# Patient Record
Sex: MALE | Race: WHITE | NOT HISPANIC OR LATINO
[De-identification: names, ages, dates, MRNs, and addresses within clinical notes are randomized per-mention and may not be internally consistent; named-entity substitution may affect disease eponyms.]

---

## 2017-03-21 ENCOUNTER — APPOINTMENT (OUTPATIENT)
Dept: CARDIOLOGY | Facility: CLINIC | Age: 52
End: 2017-03-21

## 2017-04-04 ENCOUNTER — APPOINTMENT (OUTPATIENT)
Dept: CARDIOLOGY | Facility: CLINIC | Age: 52
End: 2017-04-04

## 2017-04-07 ENCOUNTER — APPOINTMENT (OUTPATIENT)
Dept: CARDIOLOGY | Facility: CLINIC | Age: 52
End: 2017-04-07

## 2017-05-03 ENCOUNTER — APPOINTMENT (OUTPATIENT)
Dept: CARDIOLOGY | Facility: CLINIC | Age: 52
End: 2017-05-03

## 2017-05-10 ENCOUNTER — APPOINTMENT (OUTPATIENT)
Dept: CARDIOLOGY | Facility: CLINIC | Age: 52
End: 2017-05-10

## 2017-05-24 ENCOUNTER — APPOINTMENT (OUTPATIENT)
Dept: CARDIOLOGY | Facility: CLINIC | Age: 52
End: 2017-05-24

## 2017-08-14 ENCOUNTER — APPOINTMENT (OUTPATIENT)
Dept: CARDIOLOGY | Facility: CLINIC | Age: 52
End: 2017-08-14
Payer: COMMERCIAL

## 2017-08-14 PROCEDURE — 99214 OFFICE O/P EST MOD 30 MIN: CPT

## 2017-11-27 ENCOUNTER — RX RENEWAL (OUTPATIENT)
Age: 52
End: 2017-11-27

## 2017-12-15 ENCOUNTER — RX RENEWAL (OUTPATIENT)
Age: 52
End: 2017-12-15

## 2018-01-26 ENCOUNTER — RECORD ABSTRACTING (OUTPATIENT)
Age: 53
End: 2018-01-26

## 2018-01-26 DIAGNOSIS — Z82.49 FAMILY HISTORY OF ISCHEMIC HEART DISEASE AND OTHER DISEASES OF THE CIRCULATORY SYSTEM: ICD-10-CM

## 2018-01-26 DIAGNOSIS — Z87.898 PERSONAL HISTORY OF OTHER SPECIFIED CONDITIONS: ICD-10-CM

## 2018-01-26 DIAGNOSIS — Z87.19 PERSONAL HISTORY OF OTHER DISEASES OF THE DIGESTIVE SYSTEM: ICD-10-CM

## 2018-01-26 DIAGNOSIS — R60.0 LOCALIZED EDEMA: ICD-10-CM

## 2018-01-26 DIAGNOSIS — Z78.9 OTHER SPECIFIED HEALTH STATUS: ICD-10-CM

## 2018-01-26 DIAGNOSIS — Z84.89 FAMILY HISTORY OF OTHER SPECIFIED CONDITIONS: ICD-10-CM

## 2018-01-26 RX ORDER — ATORVASTATIN CALCIUM 10 MG/1
10 TABLET, FILM COATED ORAL
Refills: 0 | Status: COMPLETED | COMMUNITY

## 2018-02-12 ENCOUNTER — NON-APPOINTMENT (OUTPATIENT)
Age: 53
End: 2018-02-12

## 2018-02-12 ENCOUNTER — APPOINTMENT (OUTPATIENT)
Dept: CARDIOLOGY | Facility: CLINIC | Age: 53
End: 2018-02-12
Payer: COMMERCIAL

## 2018-02-12 VITALS
HEIGHT: 67 IN | OXYGEN SATURATION: 97 % | HEART RATE: 64 BPM | SYSTOLIC BLOOD PRESSURE: 110 MMHG | DIASTOLIC BLOOD PRESSURE: 62 MMHG | BODY MASS INDEX: 32.02 KG/M2 | WEIGHT: 204 LBS

## 2018-02-12 DIAGNOSIS — Z87.891 PERSONAL HISTORY OF NICOTINE DEPENDENCE: ICD-10-CM

## 2018-02-12 DIAGNOSIS — F17.200 NICOTINE DEPENDENCE, UNSPECIFIED, UNCOMPLICATED: ICD-10-CM

## 2018-02-12 PROCEDURE — 93000 ELECTROCARDIOGRAM COMPLETE: CPT

## 2018-02-12 PROCEDURE — 99214 OFFICE O/P EST MOD 30 MIN: CPT

## 2018-02-12 RX ORDER — SAXAGLIPTIN AND METFORMIN HYDROCHLORIDE 2.5; 1 MG/1; MG/1
2.5-1 TABLET, FILM COATED, EXTENDED RELEASE ORAL
Qty: 90 | Refills: 3 | Status: DISCONTINUED | COMMUNITY
End: 2018-02-12

## 2018-02-12 RX ORDER — TAMSULOSIN HYDROCHLORIDE 0.4 MG/1
0.4 CAPSULE ORAL
Qty: 90 | Refills: 1 | Status: DISCONTINUED | COMMUNITY
End: 2018-02-12

## 2018-02-14 ENCOUNTER — MEDICATION RENEWAL (OUTPATIENT)
Age: 53
End: 2018-02-14

## 2018-02-26 ENCOUNTER — APPOINTMENT (OUTPATIENT)
Dept: CARDIOLOGY | Facility: CLINIC | Age: 53
End: 2018-02-26

## 2018-03-02 ENCOUNTER — MEDICATION RENEWAL (OUTPATIENT)
Age: 53
End: 2018-03-02

## 2018-03-09 ENCOUNTER — APPOINTMENT (OUTPATIENT)
Dept: CARDIOLOGY | Facility: CLINIC | Age: 53
End: 2018-03-09
Payer: COMMERCIAL

## 2018-03-09 DIAGNOSIS — Z86.39 PERSONAL HISTORY OF OTHER ENDOCRINE, NUTRITIONAL AND METABOLIC DISEASE: ICD-10-CM

## 2018-03-09 DIAGNOSIS — Z87.19 PERSONAL HISTORY OF OTHER DISEASES OF THE DIGESTIVE SYSTEM: ICD-10-CM

## 2018-03-09 DIAGNOSIS — Z86.79 PERSONAL HISTORY OF OTHER DISEASES OF THE CIRCULATORY SYSTEM: ICD-10-CM

## 2018-03-09 PROCEDURE — A9502: CPT

## 2018-03-09 PROCEDURE — 93015 CV STRESS TEST SUPVJ I&R: CPT

## 2018-03-09 PROCEDURE — 78452 HT MUSCLE IMAGE SPECT MULT: CPT

## 2018-03-12 ENCOUNTER — APPOINTMENT (OUTPATIENT)
Dept: CARDIOLOGY | Facility: CLINIC | Age: 53
End: 2018-03-12
Payer: COMMERCIAL

## 2018-03-12 VITALS
BODY MASS INDEX: 32.02 KG/M2 | HEART RATE: 80 BPM | WEIGHT: 204 LBS | SYSTOLIC BLOOD PRESSURE: 110 MMHG | OXYGEN SATURATION: 96 % | HEIGHT: 67 IN | DIASTOLIC BLOOD PRESSURE: 60 MMHG

## 2018-03-12 PROCEDURE — 99214 OFFICE O/P EST MOD 30 MIN: CPT

## 2018-03-12 RX ORDER — OXYBUTYNIN CHLORIDE 5 MG/1
5 TABLET ORAL DAILY
Refills: 0 | Status: DISCONTINUED | COMMUNITY
End: 2018-03-12

## 2018-03-12 RX ORDER — ALIROCUMAB 75 MG/ML
75 INJECTION, SOLUTION SUBCUTANEOUS
Qty: 6 | Refills: 1 | Status: DISCONTINUED | COMMUNITY
Start: 2018-02-12 | End: 2018-03-12

## 2018-05-21 ENCOUNTER — MEDICATION RENEWAL (OUTPATIENT)
Age: 53
End: 2018-05-21

## 2018-05-30 ENCOUNTER — APPOINTMENT (OUTPATIENT)
Dept: CARDIOLOGY | Facility: CLINIC | Age: 53
End: 2018-05-30
Payer: COMMERCIAL

## 2018-05-30 VITALS
WEIGHT: 201 LBS | RESPIRATION RATE: 16 BRPM | DIASTOLIC BLOOD PRESSURE: 68 MMHG | HEART RATE: 80 BPM | OXYGEN SATURATION: 96 % | HEIGHT: 67 IN | BODY MASS INDEX: 31.55 KG/M2 | SYSTOLIC BLOOD PRESSURE: 120 MMHG

## 2018-05-30 PROCEDURE — 99214 OFFICE O/P EST MOD 30 MIN: CPT

## 2018-09-06 ENCOUNTER — RX RENEWAL (OUTPATIENT)
Age: 53
End: 2018-09-06

## 2018-09-17 ENCOUNTER — APPOINTMENT (OUTPATIENT)
Dept: CARDIOLOGY | Facility: CLINIC | Age: 53
End: 2018-09-17
Payer: COMMERCIAL

## 2018-09-17 ENCOUNTER — RECORD ABSTRACTING (OUTPATIENT)
Age: 53
End: 2018-09-17

## 2018-09-17 VITALS
HEIGHT: 67 IN | WEIGHT: 206 LBS | DIASTOLIC BLOOD PRESSURE: 70 MMHG | BODY MASS INDEX: 32.33 KG/M2 | HEART RATE: 72 BPM | SYSTOLIC BLOOD PRESSURE: 120 MMHG | OXYGEN SATURATION: 97 %

## 2018-09-17 DIAGNOSIS — Z01.810 ENCOUNTER FOR PREPROCEDURAL CARDIOVASCULAR EXAMINATION: ICD-10-CM

## 2018-09-17 PROCEDURE — 99214 OFFICE O/P EST MOD 30 MIN: CPT

## 2018-11-13 ENCOUNTER — EMERGENCY (EMERGENCY)
Facility: HOSPITAL | Age: 53
LOS: 1 days | End: 2018-11-13
Payer: COMMERCIAL

## 2018-11-13 ENCOUNTER — OUTPATIENT (OUTPATIENT)
Dept: OUTPATIENT SERVICES | Facility: HOSPITAL | Age: 53
LOS: 1 days | End: 2018-11-13

## 2018-11-13 PROCEDURE — 93971 EXTREMITY STUDY: CPT | Mod: 26,RT

## 2018-11-13 PROCEDURE — 99285 EMERGENCY DEPT VISIT HI MDM: CPT

## 2018-11-13 PROCEDURE — 71046 X-RAY EXAM CHEST 2 VIEWS: CPT | Mod: 26

## 2018-11-14 PROCEDURE — 99285 EMERGENCY DEPT VISIT HI MDM: CPT

## 2018-11-20 ENCOUNTER — APPOINTMENT (OUTPATIENT)
Dept: CARDIOLOGY | Facility: CLINIC | Age: 53
End: 2018-11-20
Payer: COMMERCIAL

## 2018-11-20 VITALS
OXYGEN SATURATION: 98 % | HEIGHT: 67 IN | SYSTOLIC BLOOD PRESSURE: 124 MMHG | BODY MASS INDEX: 32.8 KG/M2 | HEART RATE: 81 BPM | DIASTOLIC BLOOD PRESSURE: 77 MMHG | WEIGHT: 209 LBS

## 2018-11-20 PROCEDURE — 99214 OFFICE O/P EST MOD 30 MIN: CPT

## 2018-11-20 RX ORDER — SITAGLIPTIN AND METFORMIN HYDROCHLORIDE 50; 1000 MG/1; MG/1
50-1000 TABLET, FILM COATED, EXTENDED RELEASE ORAL DAILY
Refills: 0 | Status: DISCONTINUED | COMMUNITY
End: 2018-11-20

## 2018-11-20 NOTE — HISTORY OF PRESENT ILLNESS
[FreeTextEntry1] : \par As you know his other active medical problems are as listed below.\par \par CAD: The patient is status post PCI of the left circumflex. 10/15.  Now the patient states he feels much better. Patient has no exertional chest discomfort or shortness of breath. CCS class 0.  On aspirin and Plavix. \par No bleeding complications. associated risks and benefits have been reviewed. DAP and associated risks/benefits have been reviewd.   On beta blockers.  Preserved LV systolic function.  On statin therapy.  Though at low dose due to intolerances.\par \par Dyslipidemia.  \par mixed. Could not tolerate Crestor.on REPATHA and fish oil. tolerating it well.\par \par Hypertensive heart disease.  No CHF.  No CKD.  Nicotine addiction. Stopped cigarette smoking.\par \par Type 2 diabetes mellitus. We well controlled. Last hemoglobin A1c 6.1. Uncomplicated.\par

## 2018-11-20 NOTE — REVIEW OF SYSTEMS
[Shortness Of Breath] : no shortness of breath [Dyspnea on exertion] : not dyspnea during exertion [Chest  Pressure] : no chest pressure [Chest Pain] : no chest pain [Lower Ext Edema] : no extremity edema [Leg Claudication] : no intermittent leg claudication [Palpitations] : no palpitations [Joint Pain] : no joint pain [Joint Swelling] : no joint swelling [Joint Stiffness] : no joint stiffness [Muscle Cramps] : no muscle cramps [Limb Weakness (Paresis)] : no limb weakness [Negative] : Heme/Lymph

## 2018-11-20 NOTE — REASON FOR VISIT
[Follow-Up - From Hospitalization] : follow-up of a recent hospitalization for [Chest Pain] : chest pain [Coronary Artery Disease] : coronary artery disease [Hyperlipidemia] : hyperlipidemia [Hypertension] : hypertension [Medication Management] : Medication management [FreeTextEntry1] : A 53-year-old comes in for followup after recent hospital admission to be chronic. [facility].\par I reviewed the available information from the hospital admission.\par He had intermittent left arm, neck, and upper shoulder pain, as well as left upper chest pain. All day long on November 13, 2018. He went to the hospital emergency room had an EKG, which showed normal sinus rhythm, incomplete right bundle branch block and nonspecific ST-T changes. His EKG remained stable. His troponins were negative. His labs otherwise were stable. He had left upper on DVT study, which was negative. His d-dimer was normal. He was seen and discharged home with diagnosis of musculoskeletal pain. His symptoms since then have almost resolved. He has no exertional symptoms. There was no associated diaphoresis, palpitations, nausea or vomiting.

## 2018-11-20 NOTE — ASSESSMENT
[FreeTextEntry1] : \par  Past test for reference.\par \par Reviewed labs from May 31, 2017 . glucose 126, potassium 4.6, sodium 137, creatinine 0.8 LFT normal. LDL 58, PhD of 39. Triglycerides 152. Total cholesterol 1.7.\par Other labs, June 14, 2017. Creatinine 0.85. Potassium 4.6, sodium 138.\par \par Coronary angiogram. October 2015. LV ejection fraction 55%. LCx 90% treated with drug-eluting stent placed. Mid RCA 10-30%.\par \par Echocardiogram. 12/1/2014. EF 55% PMR left atrial enlargement. l left ventricular hypertrophy. Mild diastolic LV dysfunction. Difficult study. Trace to mild mitral and tricuspid regurgitation.\par \par Carotid Doppler study. 12 2014. Mild nonobstructive disease bilaterally.\par \par Labs 5/4/2016 LDL 53 triglycerides 217 HDL 32 total cholesterol 128 LFT within normal range. \par \par EKG ordered and interpreted by me on 1219 2016 reviewed. Indication coronary artery disease. Interpretation by me. Normal sinus rhythm. Incomplete right bundle branch block\par \par echocardiogram with contrast enhancement was ordered and performed on 5/3/17. Demonstrated EF 60%, mild left ventricular enlargement with normal LV systolic function. \par \par Carotid Doppler, syncope, April 4, 2017. Intimal thickening.\par Echocardiogram. April 4, 2017. Technically very difficult study. Possible left ventricular enlargement. Possible to preserve his systolic function, though difficult to evaluate wall motion. There is borderline pulmonary pressures. There was mild mitral regurgitation.\par \par EKG ordered and interpreted by me on February 12, 2018. Indication atypical chest pain. According arteries is being dictation. Sinus bradycardia. Nonspecific ST-T changes.\par \par Reviewed on March 12, 2018.\par Most recent labs showed LDL cholesterol 79. otherwise, stable. CMP. This is on atorvastatin 10 mg.\par \par Stress myocardial perfusion scan. March 9, 2018. 9 minutes of [place] protocol. 93%. 10 mets. No chest pain. Stable. Blood pressure. Nonischemic EKG. Small mild defect made anteroseptal region, suggesting a possible infarct. Ejection fraction 59%.\par \par Reviewed on November 20, 2018\par EKG, chest x-ray, left upper extremity DVT study, radiology consultation report, and labs from November 13 in November 14, hospital admission, were reviewed from cardiology perspective.

## 2018-11-20 NOTE — PHYSICAL EXAM
[General Appearance - Well Developed] : well developed [Normal Appearance] : normal appearance [Well Groomed] : well groomed [General Appearance - Well Nourished] : well nourished [No Deformities] : no deformities [General Appearance - In No Acute Distress] : no acute distress [Normal Conjunctiva] : the conjunctiva exhibited no abnormalities [Eyelids - No Xanthelasma] : the eyelids demonstrated no xanthelasmas [No Oral Pallor] : no oral pallor [No Oral Cyanosis] : no oral cyanosis [] : no respiratory distress [Respiration, Rhythm And Depth] : normal respiratory rhythm and effort [Exaggerated Use Of Accessory Muscles For Inspiration] : no accessory muscle use [Auscultation Breath Sounds / Voice Sounds] : lungs were clear to auscultation bilaterally [Heart Rate And Rhythm] : heart rate and rhythm were normal [Heart Sounds] : normal S1 and S2 [Murmurs] : no murmurs present [Arterial Pulses Normal] : the arterial pulses were normal [Edema] : no peripheral edema present [Veins - Varicosity Changes] : no varicosital changes were noted in the lower extremities [FreeTextEntry1] : no gallop/rub/heave/click, normal PMI. no bruits [Abdomen Soft] : soft [Abdomen Tenderness] : non-tender [Abnormal Walk] : normal gait [Gait - Sufficient For Exercise Testing] : the gait was sufficient for exercise testing [Nail Clubbing] : no clubbing of the fingernails [Cyanosis, Localized] : no localized cyanosis [Oriented To Time, Place, And Person] : oriented to person, place, and time [Affect] : the affect was normal [Mood] : the mood was normal [No Anxiety] : not feeling anxious

## 2018-11-20 NOTE — DISCUSSION/SUMMARY
[FreeTextEntry1] : #1 Tolerating REPATHA , very well. Repeat CMP, lipid panel. No significant side effects. Continue lifestyle and risk factor modifications.\par \par #2 hypertensive heart disease. Mild left ventricular enlargement. Normal LV systolic function. Tolerating losartan 50 mg twice daily/ metoprolol.  He will continue with that at present. He is stable. Blood pressure. Understands risk and benefits.\par \par #3 hyperglycemia. Type 2 diabetes mellitus. Uncomplicated. At present. Counseling regarding dietary restrictions regular activity and exercise was done. Goals have been reviewed.\par \par #4 coronary atherosclerosis. Native vessel. Prior PCI in 2015 to left circumflex. Atypical symptoms. Probable musculoskeletal. Abnormal EKG at baseline. Stable findings reviewed the options of invasive evaluation and associated risks and benefits discussed. Decided with continued aggressive secondary prevention unless change in the symptoms in that case, he would go for invasive coronary angiography .\par If any change in his symptoms. He is to call 911.\par Continue aggressive lifestyle and risk factor modifications.\par Continue aspirin and Plavix at present.\par Lipid management as noted above.\par  Understand risks and benefits of dual antiplatelet agents. He has opted for continued dual antiplatelet agent. Continue low-dose beta blockers. \par \par #5 BMI goal closer to 25.\par \par #6 continue abstinence from cigarettes.\par \par #7 regular. Activity exercise were reviewed.\par \par All the above were at length reviewed. Answered all the questions. Thank you very much for this kind referral. Please do not hesitate to give me a call for any question.\par Part of this transcription was done with voice recognition software and phonetically similar errors are common. I apologize for that. Please donot hesitate to call for any questions due to above.\par \par f/u after 6 months or for any change in symptoms

## 2018-11-21 ENCOUNTER — TRANSCRIPTION ENCOUNTER (OUTPATIENT)
Age: 53
End: 2018-11-21

## 2018-12-03 ENCOUNTER — APPOINTMENT (OUTPATIENT)
Dept: CARDIOLOGY | Facility: CLINIC | Age: 53
End: 2018-12-03

## 2018-12-11 ENCOUNTER — RX RENEWAL (OUTPATIENT)
Age: 53
End: 2018-12-11

## 2018-12-18 ENCOUNTER — RX RENEWAL (OUTPATIENT)
Age: 53
End: 2018-12-18

## 2018-12-31 ENCOUNTER — RX RENEWAL (OUTPATIENT)
Age: 53
End: 2018-12-31

## 2018-12-31 ENCOUNTER — MED ADMIN CHARGE (OUTPATIENT)
Age: 53
End: 2018-12-31

## 2019-01-10 ENCOUNTER — MEDICATION RENEWAL (OUTPATIENT)
Age: 54
End: 2019-01-10

## 2019-01-24 ENCOUNTER — RECORD ABSTRACTING (OUTPATIENT)
Age: 54
End: 2019-01-24

## 2019-01-28 ENCOUNTER — RECORD ABSTRACTING (OUTPATIENT)
Age: 54
End: 2019-01-28

## 2019-01-28 ENCOUNTER — APPOINTMENT (OUTPATIENT)
Dept: CARDIOLOGY | Facility: CLINIC | Age: 54
End: 2019-01-28
Payer: COMMERCIAL

## 2019-01-28 VITALS
HEIGHT: 67 IN | WEIGHT: 204 LBS | OXYGEN SATURATION: 98 % | DIASTOLIC BLOOD PRESSURE: 60 MMHG | SYSTOLIC BLOOD PRESSURE: 118 MMHG | BODY MASS INDEX: 32.02 KG/M2 | HEART RATE: 75 BPM

## 2019-01-28 PROCEDURE — 99214 OFFICE O/P EST MOD 30 MIN: CPT

## 2019-01-28 RX ORDER — EVOLOCUMAB 140 MG/ML
140 INJECTION, SOLUTION SUBCUTANEOUS
Qty: 2 | Refills: 5 | Status: DISCONTINUED | COMMUNITY
Start: 2018-03-02 | End: 2019-01-28

## 2019-01-28 RX ORDER — SITAGLIPTIN 50 MG/1
50 TABLET, FILM COATED ORAL DAILY
Refills: 0 | Status: DISCONTINUED | COMMUNITY
End: 2019-01-28

## 2019-01-28 RX ORDER — OMEGA-3/DHA/EPA/FISH OIL 300-1000MG
1000 CAPSULE ORAL
Refills: 0 | Status: DISCONTINUED | COMMUNITY
End: 2019-01-28

## 2019-01-28 NOTE — ASSESSMENT
[FreeTextEntry1] : \par  Past test for reference.\par \par Reviewed labs from May 31, 2017 . glucose 126, potassium 4.6, sodium 137, creatinine 0.8 LFT normal. LDL 58, PhD of 39. Triglycerides 152. Total cholesterol 1.7.\par Other labs, June 14, 2017. Creatinine 0.85. Potassium 4.6, sodium 138.\par \par Coronary angiogram. October 2015. LV ejection fraction 55%. LCx 90% treated with drug-eluting stent placed. Mid RCA 10-30%.\par \par Echocardiogram. 12/1/2014. EF 55% PMR left atrial enlargement. l left ventricular hypertrophy. Mild diastolic LV dysfunction. Difficult study. Trace to mild mitral and tricuspid regurgitation.\par \par Carotid Doppler study. 12 2014. Mild nonobstructive disease bilaterally.\par \par Labs 5/4/2016 LDL 53 triglycerides 217 HDL 32 total cholesterol 128 LFT within normal range. \par \par EKG ordered and interpreted by me on 1219 2016 reviewed. Indication coronary artery disease. Interpretation by me. Normal sinus rhythm. Incomplete right bundle branch block\par \par echocardiogram with contrast enhancement was ordered and performed on 5/3/17. Demonstrated EF 60%, mild left ventricular enlargement with normal LV systolic function. \par \par Carotid Doppler, syncope, April 4, 2017. Intimal thickening.\par Echocardiogram. April 4, 2017. Technically very difficult study. Possible left ventricular enlargement. Possible to preserve his systolic function, though difficult to evaluate wall motion. There is borderline pulmonary pressures. There was mild mitral regurgitation.\par \par EKG ordered and interpreted by me on February 12, 2018. Indication atypical chest pain. According arteries is being dictation. Sinus bradycardia. Nonspecific ST-T changes.\par \par Reviewed on March 12, 2018.\par Most recent labs showed LDL cholesterol 79. otherwise, stable. CMP. This is on atorvastatin 10 mg.\par \par Stress myocardial perfusion scan. March 9, 2018. 9 minutes of [place] protocol. 93%. 10 mets. No chest pain. Stable. Blood pressure. Nonischemic EKG. Small mild defect made anteroseptal region, suggesting a possible infarct. Ejection fraction 59%.\par \par Reviewed on November 20, 2018\par EKG, chest x-ray, left upper extremity DVT study, radiology consultation report, and labs from November 13 in November 14, hospital admission, were reviewed from cardiology perspective.\par \par Reviewed on January 28, 2019\par Labs from January 9, 2019 were reviewed

## 2019-01-28 NOTE — REVIEW OF SYSTEMS
[Joint Pain] : no joint pain [Joint Swelling] : no joint swelling [Joint Stiffness] : no joint stiffness [Muscle Cramps] : no muscle cramps [Limb Weakness (Paresis)] : no limb weakness [Negative] : Heme/Lymph

## 2019-01-28 NOTE — REASON FOR VISIT
[Follow-Up - Clinic] : a clinic follow-up of [Chest Pain] : chest pain [Coronary Artery Disease] : coronary artery disease [Hyperlipidemia] : hyperlipidemia [Hypertension] : hypertension [Medication Management] : Medication management [FreeTextEntry1] : 53-year-old comes in for followup consultation to review labs and medical management of his hyperlipidemia with statin intolerance. \par Since last seen he has not had any further chest pain, PAs, activity level remains good.\par He has no PND, orthopnea, pedal edema.\par He has no claudication pain.\par He has no palpitation no dizziness, syncopal episode.\par No further hospital admission.\par No bleeding complications.

## 2019-01-28 NOTE — HISTORY OF PRESENT ILLNESS
[FreeTextEntry1] : As you know his other active medical problems are as listed below.\par \par CAD: The patient is status post PCI of the left circumflex. 10/15.  Now the patient states he feels much better. Patient has no exertional chest discomfort or shortness of breath. CCS class 0.  On aspirin and Plavix. \par No bleeding complications. associated risks and benefits have been reviewed. DAP and associated risks/benefits have been reviewd.   On beta blockers.  Preserved LV systolic function.  On PCSK9 inhibitors.\par \par Dyslipidemia.  \par mixed. Could not tolerate Crestor. was on REPATHA and fish oil. tolerating it well. now changed to Praluent because of insurance reason.\par \par Hypertensive heart disease.  No CHF.  No CKD.  Nicotine addiction. Stopped cigarette smoking.\par \par Type 2 diabetes mellitus. We well controlled. Last hemoglobin A1c 6.1. Uncomplicated.\par

## 2019-01-28 NOTE — DISCUSSION/SUMMARY
[FreeTextEntry1] : #1 He was tolerating REPATHA well. Her insurance company has changed it to PRALUENT. Prescription done. He will have repeat CMP, lipid panel in 3-4 months. Continue lifestyle and risk factor modifications. Any, side effects. He'll contact us immediately to\par \par #2 hypertensive heart disease. Mild left ventricular enlargement. Normal LV systolic function. Tolerating losartan 50 mg twice daily/ metoprolol.  He will continue with that at present. He is stable. Blood pressure. Understands risk and benefits.\par \par #3 hyperglycemia. Type 2 diabetes mellitus. Uncomplicated. At present. Counseling regarding dietary restrictions regular activity and exercise was done. Goals have been reviewed.\par \par #4 coronary atherosclerosis. Native vessel. Prior PCI in 2015 to left circumflex. Stable. No recurrent symptoms.\par Continue aggressive lifestyle and risk factor modifications.\par Continue aspirin and Plavix at present. Understands risks and benefits of dual antiplatelet agent. He has opted to continue it to at least October of this year as long as no other significant side effects. At that point, we have decided to stop Plavix and continue with aspirin.\par Lipid management as noted above.\par  Continue low-dose beta blockers. \par \par #5 BMI goal closer to 25.\par \par #6 continue abstinence from cigarettes.\par \par Counseling regarding low saturated fat, salt and carbohydrate intake was reviewed. Active lifestyle and regular. Exercise along with weight management is advised.\par \par All the above were at length reviewed. Answered all the questions. Thank you very much for this kind referral. Please do not hesitate to give me a call for any question.\par Part of this transcription was done with voice recognition software and phonetically similar errors are common. I apologize for that. Please donot hesitate to call for any questions due to above.\par \par f/u after 6 months or for any change in symptoms

## 2019-05-20 ENCOUNTER — APPOINTMENT (OUTPATIENT)
Dept: CARDIOLOGY | Facility: CLINIC | Age: 54
End: 2019-05-20

## 2019-06-24 ENCOUNTER — MEDICATION RENEWAL (OUTPATIENT)
Age: 54
End: 2019-06-24

## 2019-07-19 ENCOUNTER — APPOINTMENT (OUTPATIENT)
Dept: CARDIOLOGY | Facility: CLINIC | Age: 54
End: 2019-07-19
Payer: COMMERCIAL

## 2019-07-19 VITALS
DIASTOLIC BLOOD PRESSURE: 58 MMHG | OXYGEN SATURATION: 99 % | BODY MASS INDEX: 31.17 KG/M2 | HEART RATE: 58 BPM | SYSTOLIC BLOOD PRESSURE: 92 MMHG | WEIGHT: 199 LBS

## 2019-07-19 PROCEDURE — 99214 OFFICE O/P EST MOD 30 MIN: CPT

## 2019-07-22 ENCOUNTER — CLINICAL ADVICE (OUTPATIENT)
Age: 54
End: 2019-07-22

## 2019-07-22 RX ORDER — OMEPRAZOLE 40 MG/1
40 CAPSULE, DELAYED RELEASE ORAL
Qty: 90 | Refills: 3 | Status: DISCONTINUED | COMMUNITY
End: 2019-07-22

## 2019-07-22 RX ORDER — ASPIRIN ENTERIC COATED TABLETS 81 MG 81 MG/1
81 TABLET, DELAYED RELEASE ORAL DAILY
Qty: 30 | Refills: 3 | Status: DISCONTINUED | COMMUNITY
End: 2019-07-22

## 2019-07-22 NOTE — PHYSICAL EXAM
[General Appearance - Well Developed] : well developed [Normal Appearance] : normal appearance [Well Groomed] : well groomed [General Appearance - Well Nourished] : well nourished [No Deformities] : no deformities [General Appearance - In No Acute Distress] : no acute distress [Normal Conjunctiva] : the conjunctiva exhibited no abnormalities [Eyelids - No Xanthelasma] : the eyelids demonstrated no xanthelasmas [No Oral Pallor] : no oral pallor [No Oral Cyanosis] : no oral cyanosis [] : no respiratory distress [Respiration, Rhythm And Depth] : normal respiratory rhythm and effort [Exaggerated Use Of Accessory Muscles For Inspiration] : no accessory muscle use [Auscultation Breath Sounds / Voice Sounds] : lungs were clear to auscultation bilaterally [Heart Rate And Rhythm] : heart rate and rhythm were normal [Heart Sounds] : normal S1 and S2 [Murmurs] : no murmurs present [Arterial Pulses Normal] : the arterial pulses were normal [Edema] : no peripheral edema present [Veins - Varicosity Changes] : no varicosital changes were noted in the lower extremities [Abdomen Soft] : soft [Abdomen Tenderness] : non-tender [Abnormal Walk] : normal gait [Gait - Sufficient For Exercise Testing] : the gait was sufficient for exercise testing [Nail Clubbing] : no clubbing of the fingernails [Cyanosis, Localized] : no localized cyanosis [Skin Color & Pigmentation] : normal skin color and pigmentation [Oriented To Time, Place, And Person] : oriented to person, place, and time [Affect] : the affect was normal [Mood] : the mood was normal [No Anxiety] : not feeling anxious [FreeTextEntry1] : no gallop/rub/heave/click, normal PMI. no bruits

## 2019-07-22 NOTE — REVIEW OF SYSTEMS
[Negative] : Heme/Lymph [Joint Pain] : no joint pain [Joint Swelling] : no joint swelling [Joint Stiffness] : no joint stiffness [Muscle Cramps] : no muscle cramps [Limb Weakness (Paresis)] : no limb weakness

## 2019-07-22 NOTE — ADDENDUM
[FreeTextEntry1] : Considering available data, after discussion, we have decided to stop aspirin, but continue with clopidogrel long-term in presence of CAD with intervention.\par He understands risk and benefits.\par He will also avoid use of omeprazole with clopidogrel. In view of his other H2 blocker for p.r.n. use. If needed to use PPI. Consider use of pantoprazole.\par Discuss with him today.\par \par

## 2019-07-22 NOTE — HISTORY OF PRESENT ILLNESS
[FreeTextEntry1] : As you know his other active medical problems are as listed below.\par \par CAD: The patient is status post PCI of the left circumflex. 10/15.  Now the patient states he feels much better. Patient has no exertional chest discomfort or shortness of breath. CCS class 0.  On aspirin and Plavix. \par No bleeding complications. associated risks and benefits have been reviewed. DAP and associated risks/benefits have been reviewd.   On beta blockers.  Preserved LV systolic function.  On PCSK9 inhibitors.\par \par Dyslipidemia.  \par mixed. Could not tolerate Crestor. was on praluent and fish oil. tolerating it well.\par \par Hypertensive heart disease.  No CHF.  No CKD.  Nicotine addiction. Stopped cigarette smoking.\par \par Type 2 diabetes mellitus. We well controlled.On Janumet\par

## 2019-07-22 NOTE — DISCUSSION/SUMMARY
[FreeTextEntry1] : #1 He has been tolerating praluent well. Stable labs.\par \par #2 hypertensive heart disease. Mild left ventricular enlargement. Normal LV systolic function. Tolerating losartan 50 mg twice daily/ metoprolol.  He will continue with that at present. He is stable. Blood pressure. Understands risk and benefits.\par \par #3 hyperglycemia. Type 2 diabetes mellitus. Uncomplicated. At present. Counseling regarding dietary restrictions regular activity and exercise was done. Goals have been reviewed.\par \par #4 coronary atherosclerosis. Native vessel. Prior PCI in 2015 to left circumflex. Stable. No recurrent symptoms.\par Continue aggressive lifestyle and risk factor modifications.\par Continue aspirin and Plavix at present. Understands risks and benefits of dual antiplatelet agent. He has opted to continue it to at least October of this year as long as no other significant side effects. At that point, we have decided to stop Plavix and continue with aspirin.\par Lipid management as noted above.\par  Continue low-dose beta blockers. \par Echo will be repeated in presence of cardiomegaly and coronary artery disease. If progressive worsening maximization of medication, specifically afterload production, and beta blockade will be discussed.\par \par #5 BMI goal closer to 25.\par \par #6 continue abstinence from cigarettes.\par \par Counseling regarding low saturated fat, salt and carbohydrate intake was reviewed. Active lifestyle and regular. Exercise along with weight management is advised.\par \par All the above were at length reviewed. Answered all the questions. Thank you very much for this kind referral. Please do not hesitate to give me a call for any question.\par Part of this transcription was done with voice recognition software and phonetically similar errors are common. I apologize for that. Please donot hesitate to call for any questions due to above.\par \par f/u after 6 months or for any change in symptoms

## 2019-07-22 NOTE — ASSESSMENT
[FreeTextEntry1] : \par  Past test for reference.\par \par Coronary angiogram. October 2015. LV ejection fraction 55%. LCx 90% treated with drug-eluting stent placed. Mid RCA 10-30%.\par \par Echocardiogram. 12/1/2014. EF 55% PMR left atrial enlargement. l left ventricular hypertrophy. Mild diastolic LV dysfunction. Difficult study. Trace to mild mitral and tricuspid regurgitation.\par \par Carotid Doppler study. 12 2014. Mild nonobstructive disease bilaterally.\par \par EKG ordered and interpreted by me on 1219 2016 reviewed. Indication coronary artery disease. Interpretation by me. Normal sinus rhythm. Incomplete right bundle branch block\par \par echocardiogram with contrast enhancement was ordered and performed on 5/3/17. Demonstrated EF 60%, mild left ventricular enlargement with normal LV systolic function. \par \par Carotid Doppler, syncope, April 4, 2017. Intimal thickening.\par Echocardiogram. April 4, 2017. Technically very difficult study. Possible left ventricular enlargement. Possible to preserve his systolic function, though difficult to evaluate wall motion. There is borderline pulmonary pressures. There was mild mitral regurgitation.\par \par EKG ordered and interpreted by me on February 12, 2018. Indication atypical chest pain. According arteries is being dictation. Sinus bradycardia. Nonspecific ST-T changes.\par \par Stress myocardial perfusion scan. March 9, 2018. 9 minutes of [place] protocol. 93%. 10 mets. No chest pain. Stable. Blood pressure. Nonischemic EKG. Small mild defect made anteroseptal region, suggesting a possible infarct. Ejection fraction 59%.\par \par Reviewed on November 20, 2018\par EKG, chest x-ray, left upper extremity DVT study, radiology consultation report, and labs from November 13 in November 14, hospital admission, were reviewed from cardiology perspective.\par \par Reviewed on July 19, 2019.\par Labs from May 30, 2019 reviewed. Excellent lipid panel. Stable. CMP noted.

## 2019-07-22 NOTE — REASON FOR VISIT
[Follow-Up - Clinic] : a clinic follow-up of [Chest Pain] : chest pain [Coronary Artery Disease] : coronary artery disease [Hyperlipidemia] : hyperlipidemia [Hypertension] : hypertension [Medication Management] : Medication management [FreeTextEntry1] : 53-year-old comes in for followup consultation to review labs and medical management of his hyperlipidemia with statin intolerance. \par No complication in relation to use of PCSK9 inhibitors\par Since last seen he has not had any further chest pain, activity level remains good.\par He has no PND, orthopnea, pedal edema.\par He has no claudication pain.\par He has no palpitation no dizziness, syncopal episode.\par No further hospital admission.\par No bleeding complications.

## 2019-08-20 ENCOUNTER — APPOINTMENT (OUTPATIENT)
Dept: CARDIOLOGY | Facility: CLINIC | Age: 54
End: 2019-08-20

## 2019-08-23 ENCOUNTER — APPOINTMENT (OUTPATIENT)
Dept: CARDIOLOGY | Facility: CLINIC | Age: 54
End: 2019-08-23

## 2019-09-11 ENCOUNTER — MEDICATION RENEWAL (OUTPATIENT)
Age: 54
End: 2019-09-11

## 2019-10-04 ENCOUNTER — MEDICATION RENEWAL (OUTPATIENT)
Age: 54
End: 2019-10-04

## 2019-10-04 RX ORDER — LOSARTAN POTASSIUM 50 MG/1
50 TABLET, FILM COATED ORAL
Qty: 180 | Refills: 1 | Status: DISCONTINUED | COMMUNITY
Start: 2017-12-15 | End: 1900-01-01

## 2019-10-05 ENCOUNTER — TRANSCRIPTION ENCOUNTER (OUTPATIENT)
Age: 54
End: 2019-10-05

## 2019-12-30 ENCOUNTER — NON-APPOINTMENT (OUTPATIENT)
Age: 54
End: 2019-12-30

## 2019-12-30 ENCOUNTER — APPOINTMENT (OUTPATIENT)
Dept: CARDIOLOGY | Facility: CLINIC | Age: 54
End: 2019-12-30
Payer: COMMERCIAL

## 2019-12-30 VITALS
HEART RATE: 70 BPM | WEIGHT: 204 LBS | DIASTOLIC BLOOD PRESSURE: 64 MMHG | HEIGHT: 67 IN | SYSTOLIC BLOOD PRESSURE: 128 MMHG | OXYGEN SATURATION: 97 % | BODY MASS INDEX: 32.02 KG/M2

## 2019-12-30 PROCEDURE — 99215 OFFICE O/P EST HI 40 MIN: CPT

## 2019-12-30 PROCEDURE — 93000 ELECTROCARDIOGRAM COMPLETE: CPT

## 2019-12-30 RX ORDER — SITAGLIPTIN AND METFORMIN HYDROCHLORIDE 50; 1000 MG/1; MG/1
50-1000 TABLET, FILM COATED ORAL DAILY
Refills: 0 | Status: DISCONTINUED | COMMUNITY
End: 2019-12-30

## 2019-12-30 RX ORDER — CHROMIUM 200 MCG
1000 TABLET ORAL DAILY
Refills: 0 | Status: DISCONTINUED | COMMUNITY
End: 2019-12-30

## 2019-12-30 NOTE — ASSESSMENT
[FreeTextEntry1] : \par  Past test for reference.\par \par Coronary angiogram. October 2015. LV ejection fraction 55%. LCx 90% treated with drug-eluting stent placed. Mid RCA 10-30%.\par \par Echocardiogram. 12/1/2014. EF 55% PMR left atrial enlargement. l left ventricular hypertrophy. Mild diastolic LV dysfunction. Difficult study. Trace to mild mitral and tricuspid regurgitation.\par \par Carotid Doppler study. 12 2014. Mild nonobstructive disease bilaterally.\par \par EKG ordered and interpreted by me on 1219 2016 reviewed. Indication coronary artery disease. Interpretation by me. Normal sinus rhythm. Incomplete right bundle branch block\par \par echocardiogram with contrast enhancement was ordered and performed on 5/3/17. Demonstrated EF 60%, mild left ventricular enlargement with normal LV systolic function. \par \par Carotid Doppler, syncope, April 4, 2017. Intimal thickening.\par Echocardiogram. April 4, 2017. Technically very difficult study. Possible left ventricular enlargement. Possible to preserve his systolic function, though difficult to evaluate wall motion. There is borderline pulmonary pressures. There was mild mitral regurgitation.\par \par EKG ordered and interpreted by me on February 12, 2018. Indication atypical chest pain. According arteries is being dictation. Sinus bradycardia. Nonspecific ST-T changes.\par \par Stress myocardial perfusion scan. March 9, 2018. 9 minutes of [place] protocol. 93%. 10 mets. No chest pain. Stable. Blood pressure. Nonischemic EKG. Small mild defect made anteroseptal region, suggesting a possible infarct. Ejection fraction 59%.\par \par Reviewed on November 20, 2018\par EKG, chest x-ray, left upper extremity DVT study, radiology consultation report, and labs from November 13 in November 14, hospital admission, were reviewed from cardiology perspective.\par \par Reviewed on December 30th 2019\par EKG. Normal sinus rhythm. ST, T changes suggestive of ischemia. No significant worsening of changes, which are chronic.\par Labs, which were done recently on November 13 was 19 and showed a stable CBC, CMP. Triglycerides 234, total cholesterol 127, HDL 44, LDL 36. Hemoglobin A1c 6.6.

## 2019-12-30 NOTE — DISCUSSION/SUMMARY
[FreeTextEntry1] : #1Atypical chest pain. Baseline abnormal EKG. Not exceptional.\par Some dyspnea was not feeling well from abdominal pain and epigastric/substernal discomfort. He feels bloated at the time. He is being evaluated from gastroenterology point of view. Considering above symptoms and presence of\par Known coronary artery disease with prior intervention.\par Cardiomegaly.\par Multiple risk factors, which includes hypertensive heart disease, diabetes mellitus, I have recommended him to have an echocardiogram for ejection fraction, wall motion, dimension, pulmonary artery systolic pressure evaluation. If any significant worsening further evaluation may include  evaluation for coronary ischemia as well as medical management.\par \par Considering predominantly his symptoms are more probably related to gastrointestinal etiology. He will proceed with his colonoscopy. In presence of coronary intervention in the past with drug eluting stent is important for him to strictly stay on one antiplatelet agent. One. Plavix is being held. I have recommended him to be on aspirin 81 mg. He will talk to gastroenterologists to make him aware. He can switch back to Plavix when it is safe from gastroenterology point of view.\par Any significant worsening he will call 911.\par #2 hypertensive heart disease. Mild left ventricular enlargement. Normal LV systolic function. Tolerating losartan 50 mg twice daily/ metoprolol.  He will continue with that at present. He is stable. Blood pressure. Understands risk and benefits.\par #3 hyperglycemia. Type 2 diabetes mellitus. Uncomplicated. At present. Counseling regarding dietary restrictions regular activity and exercise was done. Goals have been reviewed.\par #4 coronary atherosclerosis. Native vessel. Prior PCI in 2015 to left circumflex. Stable. No recurrent symptoms.\par Continue aggressive lifestyle and risk factor modifications.\par Continue single antiplatelet agent\par Lipid management. Tolerating PCSK9 , inhibitor without significant adverse effect. Follow labs.\par  Continue low-dose beta blockers. \par #5 BMI goal closer to 25.\par #6 continue abstinence from cigarettes.\par \par Counseling regarding low saturated fat, salt and carbohydrate intake was reviewed. Active lifestyle and regular. Exercise along with weight management is advised.\par \par All the above were at length reviewed. Answered all the questions. Thank you very much for this kind referral. Please do not hesitate to give me a call for any question.\par Part of this transcription was done with voice recognition software and phonetically similar errors are common. I apologize for that. Please donot hesitate to call for any questions due to above.\par \par f/u after 6 months or for any change in symptoms

## 2019-12-30 NOTE — REASON FOR VISIT
[Follow-Up - Clinic] : a clinic follow-up of [Chest Pain] : chest pain [Coronary Artery Disease] : coronary artery disease [Hyperlipidemia] : hyperlipidemia [Hypertension] : hypertension [Medication Management] : Medication management [FreeTextEntry1] : 54-year-old gentleman comes in for followup consultation review labs and medical management around colonoscopy. He also is complaining of upper abdominal pain, and gassy feeling, nonexertional, sometimes traveling to substernal/epigastric region without radiation. Results intermittently. No associated arm pain jaw pain, diaphoresis of dictation. He has remained active and continue to work without any significant problems.\par He has dyspnea on exertion, but without any PND, orthopnea, or pedal edema. This is slightly more around the time of feeling uncomfortable.\par His medications were adjusted with stopping metformin in some improvement. He is now being evaluated by gastroenterologist, and found to have a colonoscopy.\par His dietary restriction and weight have remained stable.\par No recent hospitalization.

## 2020-01-15 ENCOUNTER — APPOINTMENT (OUTPATIENT)
Dept: CARDIOLOGY | Facility: CLINIC | Age: 55
End: 2020-01-15

## 2020-01-17 ENCOUNTER — APPOINTMENT (OUTPATIENT)
Dept: CARDIOLOGY | Facility: CLINIC | Age: 55
End: 2020-01-17
Payer: COMMERCIAL

## 2020-01-17 PROCEDURE — 93306 TTE W/DOPPLER COMPLETE: CPT

## 2020-03-11 ENCOUNTER — RX RENEWAL (OUTPATIENT)
Age: 55
End: 2020-03-11

## 2020-04-06 ENCOUNTER — APPOINTMENT (OUTPATIENT)
Dept: DISASTER EMERGENCY | Facility: CLINIC | Age: 55
End: 2020-04-06
Payer: COMMERCIAL

## 2020-04-06 PROCEDURE — 99202 OFFICE O/P NEW SF 15 MIN: CPT

## 2020-04-06 NOTE — HISTORY OF PRESENT ILLNESS
[Patient presents to the office today for COVID-19 evaluation and testing.] : Patient presents to the office today for COVID-19 evaluation and testing. 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

## 2020-04-06 NOTE — ADDENDUM
[FreeTextEntry1] : COVID 19 testing was performed.\par  \par Advised test results may take 3-7 days to return. \par Discussed diagnostic accuracy and possibility of false negative results\par Instructed to self quarantine and must remain quarantined x 7 days since sy/sx first appeared and no fever for 3 days without antifever medications.  \par Stated that self quarantine means to avoid all public areas and people. \par All patients close contacts should also self quarantine.  \par Close contacts with comorbidities at higher risk of COVID disease.  \par Social distancing once quarantine is completed.\par  \par If high risk symptoms of chest discomfort, severe shortness of breath at rest, worsening shortness of breath with minimal exertion, new or worsening wheezing, dizziness then instructed to seek immediate medical evaluation\par  \par A letter for work and patient education form was provided.\par  \par The above plan was reviewed with the patient.\par All questions have been answered.\par Instructed to call if any new symptoms\par \par LISA Voiced understanding.\par

## 2020-04-08 LAB — SARS-COV-2 N GENE NPH QL NAA+PROBE: ABNORMAL

## 2020-06-12 ENCOUNTER — APPOINTMENT (OUTPATIENT)
Dept: CARDIOLOGY | Facility: CLINIC | Age: 55
End: 2020-06-12
Payer: COMMERCIAL

## 2020-06-12 VITALS
BODY MASS INDEX: 32.33 KG/M2 | HEIGHT: 67 IN | SYSTOLIC BLOOD PRESSURE: 138 MMHG | DIASTOLIC BLOOD PRESSURE: 64 MMHG | OXYGEN SATURATION: 98 % | TEMPERATURE: 98.1 F | HEART RATE: 69 BPM | WEIGHT: 206 LBS

## 2020-06-12 DIAGNOSIS — Z20.828 CONTACT WITH AND (SUSPECTED) EXPOSURE TO OTHER VIRAL COMMUNICABLE DISEASES: ICD-10-CM

## 2020-06-12 PROCEDURE — 99215 OFFICE O/P EST HI 40 MIN: CPT

## 2020-06-12 RX ORDER — ALIROCUMAB 75 MG/ML
75 INJECTION, SOLUTION SUBCUTANEOUS
Qty: 6 | Refills: 1 | Status: DISCONTINUED | COMMUNITY
Start: 2018-12-31 | End: 2020-06-12

## 2020-06-12 RX ORDER — CHROMIUM 200 MCG
1000 TABLET ORAL WEEKLY
Refills: 0 | Status: DISCONTINUED | COMMUNITY
End: 2020-06-12

## 2020-06-12 NOTE — DISCUSSION/SUMMARY
[FreeTextEntry1] : 54-year-old gentleman with above medical history and active medical problems as noted below \par #1  COVID-19 PCR test positive.  April.  Recovered.  No symptoms.  Continue to take safety precautions.\par #2 hypertensive heart disease. Mild left ventricular enlargement. Normal LV systolic function.  Continue present medication which includes valsartan and metoprolol..  He will continue with that at present. He is stable. Blood pressure. Understands risk and benefits.  Goal less than 130/80.\par #3 hyperglycemia. Type 2 diabetes mellitus. Uncomplicated. At present. Counseling regarding dietary restrictions regular activity and exercise was done. Goals have been reviewed.\par #4 coronary atherosclerosis. Native vessel. Prior PCI in 2015 to left circumflex. Stable. No recurrent symptoms.\par Continue aggressive lifestyle and risk factor modifications.\par Continue single antiplatelet agent\par #5 hyperlipidemia.  Lipid management. Tolerating PCSK9 , inhibitor without significant adverse effect. Follow labs.\par  Continue low-dose beta blockers. \par #6 continue abstinence from cigarettes.\par \par Counseling regarding low saturated fat, salt and carbohydrate intake was reviewed. Active lifestyle and regular. Exercise along with weight management is advised.\par \par All the above were at length reviewed. Answered all the questions. Thank you very much for this kind referral. Please do not hesitate to give me a call for any question.\par Part of this transcription was done with voice recognition software and phonetically similar errors are common. I apologize for that. Please donot hesitate to call for any questions due to above.\par \par f/u after 6 months or for any change in symptoms

## 2020-06-12 NOTE — HISTORY OF PRESENT ILLNESS
[FreeTextEntry1] : As you know his other active medical problems are as listed below.\par \par CAD: The patient is status post PCI of the left circumflex. 10/15.  Now the patient states he feels much better. Patient has no exertional chest discomfort or shortness of breath. CCS class 0.    On beta blockers.  Preserved LV systolic function.  now On PCSK9 inhibitors.\par \par Dyslipidemia.  \par mixed. Could not tolerate Crestor. was on praluent and fish oil. tolerating it well.\par \par Hypertensive heart disease.  No CHF.  No CKD.  Nicotine addiction. Stopped cigarette smoking.\par \par Type 2 diabetes mellitus. We well controlled.On Janumet\par

## 2020-06-12 NOTE — ASSESSMENT
[FreeTextEntry1] : \par  Past test for reference.\par \par Coronary angiogram. October 2015. LV ejection fraction 55%. LCx 90% treated with drug-eluting stent placed. Mid RCA 10-30%.\par \par Echocardiogram. 12/1/2014. EF 55% PMR left atrial enlargement. l left ventricular hypertrophy. Mild diastolic LV dysfunction. Difficult study. Trace to mild mitral and tricuspid regurgitation.\par \par Carotid Doppler study. 12 2014. Mild nonobstructive disease bilaterally.\par \par EKG ordered and interpreted by me on 1219 2016 reviewed. Indication coronary artery disease. Interpretation by me. Normal sinus rhythm. Incomplete right bundle branch block\par \par echocardiogram with contrast enhancement was ordered and performed on 5/3/17. Demonstrated EF 60%, mild left ventricular enlargement with normal LV systolic function. \par \par Carotid Doppler, syncope, April 4, 2017. Intimal thickening.\par Echocardiogram. April 4, 2017. Technically very difficult study. Possible left ventricular enlargement. Possible to preserve his systolic function, though difficult to evaluate wall motion. There is borderline pulmonary pressures. There was mild mitral regurgitation.\par \par EKG ordered and interpreted by me on February 12, 2018. Indication atypical chest pain. According arteries is being dictation. Sinus bradycardia. Nonspecific ST-T changes.\par \par Stress myocardial perfusion scan. March 9, 2018. 9 minutes of [place] protocol. 93%. 10 mets. No chest pain. Stable. Blood pressure. Nonischemic EKG. Small mild defect made anteroseptal region, suggesting a possible infarct. Ejection fraction 59%.\par \par Reviewed on November 20, 2018\par EKG, chest x-ray, left upper extremity DVT study, radiology consultation report, and labs from November 13 in November 14, hospital admission, were reviewed from cardiology perspective.\par \par Reviewed on December 30th 2019\par EKG. Normal sinus rhythm. ST, T changes suggestive of ischemia. No significant worsening of changes, which are chronic.\par Labs, which were done recently on November 13 was 19 and showed a stable CBC, CMP. Triglycerides 234, total cholesterol 127, HDL 44, LDL 36. Hemoglobin A1c 6.6.\par \par Reviewed on June 11, 2020\par Echocardiogram January 17, 2020 preserved LV systolic function EF around 60 to 65% minimal to mild mitral regurgitation pulmonary pressures normal normal left atrium\par LabsFebruary 26, 2020 were reviewed next COVID-19 PCR test April 6, 2020 was positive

## 2020-06-12 NOTE — PHYSICAL EXAM
[General Appearance - Well Developed] : well developed [Well Groomed] : well groomed [Normal Appearance] : normal appearance [General Appearance - Well Nourished] : well nourished [No Deformities] : no deformities [General Appearance - In No Acute Distress] : no acute distress [Normal Conjunctiva] : the conjunctiva exhibited no abnormalities [Eyelids - No Xanthelasma] : the eyelids demonstrated no xanthelasmas [No Oral Pallor] : no oral pallor [Respiration, Rhythm And Depth] : normal respiratory rhythm and effort [] : no respiratory distress [No Oral Cyanosis] : no oral cyanosis [Auscultation Breath Sounds / Voice Sounds] : lungs were clear to auscultation bilaterally [Heart Rate And Rhythm] : heart rate and rhythm were normal [Exaggerated Use Of Accessory Muscles For Inspiration] : no accessory muscle use [Murmurs] : no murmurs present [Heart Sounds] : normal S1 and S2 [Arterial Pulses Normal] : the arterial pulses were normal [Veins - Varicosity Changes] : no varicosital changes were noted in the lower extremities [Edema] : no peripheral edema present [Abdomen Soft] : soft [Abdomen Tenderness] : non-tender [Nail Clubbing] : no clubbing of the fingernails [Abnormal Walk] : normal gait [Gait - Sufficient For Exercise Testing] : the gait was sufficient for exercise testing [Cyanosis, Localized] : no localized cyanosis [Oriented To Time, Place, And Person] : oriented to person, place, and time [Skin Color & Pigmentation] : normal skin color and pigmentation [Mood] : the mood was normal [Affect] : the affect was normal [No Anxiety] : not feeling anxious [FreeTextEntry1] : no gallop/rub/heave/click, normal PMI. no bruits

## 2020-06-12 NOTE — REVIEW OF SYSTEMS
[Negative] : Endocrine [Joint Swelling] : no joint swelling [Joint Pain] : no joint pain [Joint Stiffness] : no joint stiffness [Muscle Cramps] : no muscle cramps [Limb Weakness (Paresis)] : no limb weakness

## 2020-06-12 NOTE — REASON FOR VISIT
[Chest Pain] : chest pain [Follow-Up - Clinic] : a clinic follow-up of [Coronary Artery Disease] : coronary artery disease [Hypertension] : hypertension [Hyperlipidemia] : hyperlipidemia [Medication Management] : Medication management [FreeTextEntry1] : 54-year-old gentleman comes in for followup consultation after recent COVID-19 infection in April.  He has recovered well.  Started doing more activity.\par He has no significant exertion chest pain.  Occasional nonexertional symptoms lasting for few seconds.  No radiation no associated other symptoms.\par He has dyspnea on exertion, but without any PND, orthopnea, or pedal edema. This is slightly more around the time of feeling uncomfortable.\par His dietary restriction and weight have remained stable.\par No recent hospitalization.

## 2020-10-23 ENCOUNTER — TRANSCRIPTION ENCOUNTER (OUTPATIENT)
Age: 55
End: 2020-10-23

## 2020-12-02 ENCOUNTER — NON-APPOINTMENT (OUTPATIENT)
Age: 55
End: 2020-12-02

## 2020-12-07 ENCOUNTER — APPOINTMENT (OUTPATIENT)
Dept: CARDIOLOGY | Facility: CLINIC | Age: 55
End: 2020-12-07

## 2021-01-11 ENCOUNTER — NON-APPOINTMENT (OUTPATIENT)
Age: 56
End: 2021-01-11

## 2021-01-11 ENCOUNTER — APPOINTMENT (OUTPATIENT)
Dept: CARDIOLOGY | Facility: CLINIC | Age: 56
End: 2021-01-11
Payer: COMMERCIAL

## 2021-01-11 VITALS
HEART RATE: 69 BPM | BODY MASS INDEX: 30.76 KG/M2 | DIASTOLIC BLOOD PRESSURE: 76 MMHG | TEMPERATURE: 96.8 F | WEIGHT: 196 LBS | OXYGEN SATURATION: 97 % | HEIGHT: 67 IN | SYSTOLIC BLOOD PRESSURE: 118 MMHG

## 2021-01-11 DIAGNOSIS — U07.1 COVID-19: ICD-10-CM

## 2021-01-11 PROCEDURE — 99214 OFFICE O/P EST MOD 30 MIN: CPT

## 2021-01-11 PROCEDURE — 99072 ADDL SUPL MATRL&STAF TM PHE: CPT

## 2021-01-11 PROCEDURE — 93000 ELECTROCARDIOGRAM COMPLETE: CPT

## 2021-01-11 RX ORDER — SITAGLIPTIN 100 MG/1
100 TABLET, FILM COATED ORAL DAILY
Refills: 0 | Status: DISCONTINUED | COMMUNITY
End: 2021-01-11

## 2021-01-11 NOTE — DISCUSSION/SUMMARY
[FreeTextEntry1] : LISA DARNELL is a 55 year old M who presents today Jan 11, 2021 with the above history and the following active issues: \par \par #1 PVCs, sympmtomatic at times. Seems to be aggravated in the setting of sleep deprivation. Pt is on beta blocker.  Recommend 24h holter to evaluate PVC burden and echocardiogram to evaluated LVEF. Given hx of CAD/PCI, recommend ischemic evaluation to r/o coronary obstruction.  Discussed options for ischemic evaluation in office and opted for nuclear myocardial perfusion imaging. If any insurance/coverage issues will call pt and re-evaluate. \par \par #2 hypertensive heart disease. Mild left ventricular enlargement. Normal LV systolic function.  Continue present medication which includes valsartan and metoprolol.. Renal function and electrolytes are stable per recent labs.   He will continue with that at present. He is stable.  Goal less than 130/80.\par \par #3 hyperglycemia. Type 2 diabetes mellitus. A1c is now at goal 5.8, continue management with PCP. \par \par #4 coronary atherosclerosis. Native vessel. Prior PCI in 2015 to left circumflex. EKG shows PVCs. Pt has both typical and atypical symptoms concerning for possible angina. Recommend ischemic eval as above. Continue aggressive lifestyle and risk factor modifications. Continue single antiplatelet agent and current lipid management. \par \par #5 hyperlipidemia.  Lipid management. Tolerating PCSK9 and vascepa and fenofibrate without significant adverse effect. Labs reviewed, excellent response. No changes to medications today. \par \par #6 continue abstinence from cigarettes.\par \par Counseling regarding low saturated fat, salt and carbohydrate intake was reviewed. Active lifestyle and regular. Exercise along with weight management is advised.\par \par F/U after testing. \par Reviewed red flag symptoms which would warrant emergent medical evaluation. \par Any questions and concerns were addressed and resolved. \par \par Sincerely,\par \par CADEN Herzog\par Patients history, testing, and plan reviewed with supervising MD: Dr. Ashwin Garsia

## 2021-01-11 NOTE — REASON FOR VISIT
[Follow-Up - Clinic] : a clinic follow-up of [Chest Pain] : chest pain [Coronary Artery Disease] : coronary artery disease [Hypertension] : hypertension [Hyperlipidemia] : hyperlipidemia [Medication Management] : Medication management [FreeTextEntry1] : 55-year-old gentleman comes in for followup consultation.\par \par PMH of\par CAD: The patient is status post PCI of the left circumflex. 10/15.  Now the patient states he feels much better. Patient has no exertional chest discomfort or shortness of breath. CCS class 0.    On beta blockers.  Preserved LV systolic function.  now On PCSK9 inhibitors.\par \par Dyslipidemia.  mixed. Could not tolerate Crestor. was on praluent and fish oil. tolerating it well.\par \par Hypertensive heart disease.  No CHF.  No CKD.  Past nicotine addiction. Stopped cigarette smoking.\par \par Type 2 diabetes mellitus. Improved A1c on tradjenta. \par \par \par There has been no recent illness or hospitalization. He is recovered from COVID infection April 2020, had vaccine Dec 2020. 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 Recently has been lacking adequate sleep. He has nonspecific left sided chest pain presenting at rest, lasting seconds and resolving, nonexertional. No instigating or alleviating factors. Occasionally notes palpitations. Has mildly increased LUI. Denies any dizziness, near syncope, syncope. Denies orthopnea, edema, weight gain.

## 2021-01-11 NOTE — REVIEW OF SYSTEMS
[see HPI] : see HPI [Dyspnea on exertion] : dyspnea during exertion [Chest Pain] : chest pain [Palpitations] : palpitations [Joint Pain] : no joint pain [Joint Swelling] : no joint swelling [Joint Stiffness] : no joint stiffness [Muscle Cramps] : no muscle cramps [Limb Weakness (Paresis)] : no limb weakness [Under Stress] : under stress [Negative] : Heme/Lymph

## 2021-01-11 NOTE — PHYSICAL EXAM
[General Appearance - Well Developed] : well developed [Normal Appearance] : normal appearance [Well Groomed] : well groomed [General Appearance - Well Nourished] : well nourished [No Deformities] : no deformities [General Appearance - In No Acute Distress] : no acute distress [Normal Conjunctiva] : the conjunctiva exhibited no abnormalities [Eyelids - No Xanthelasma] : the eyelids demonstrated no xanthelasmas [No Oral Pallor] : no oral pallor [No Oral Cyanosis] : no oral cyanosis [Respiration, Rhythm And Depth] : normal respiratory rhythm and effort [] : no respiratory distress [Exaggerated Use Of Accessory Muscles For Inspiration] : no accessory muscle use [Auscultation Breath Sounds / Voice Sounds] : lungs were clear to auscultation bilaterally [Heart Sounds] : normal S1 and S2 [Murmurs] : no murmurs present [Arterial Pulses Normal] : the arterial pulses were normal [Edema] : no peripheral edema present [Veins - Varicosity Changes] : no varicosital changes were noted in the lower extremities [Regular-Premature Beats] : the rhythm was regular with premature beats [FreeTextEntry1] : no gallop/rub/heave/click, normal PMI. no bruits [Abdomen Soft] : soft [Abdomen Tenderness] : non-tender [Abnormal Walk] : normal gait [Gait - Sufficient For Exercise Testing] : the gait was sufficient for exercise testing [Nail Clubbing] : no clubbing of the fingernails [Cyanosis, Localized] : no localized cyanosis [Skin Color & Pigmentation] : normal skin color and pigmentation [Oriented To Time, Place, And Person] : oriented to person, place, and time [Affect] : the affect was normal [Mood] : the mood was normal [No Anxiety] : not feeling anxious

## 2021-01-11 NOTE — ASSESSMENT
[FreeTextEntry1] : \par  Past test for reference.\par \par Stress myocardial perfusion scan. March 9, 2018. 9 minutes of [place] protocol. 93%. 10 mets. No chest pain. Stable. Blood pressure. Nonischemic EKG. Small mild defect made anteroseptal region, suggesting a possible infarct. Ejection fraction 59%.\par \par Echocardiogram January 17, 2020 preserved LV systolic function EF around 60 to 65% minimal to mild mitral regurgitation pulmonary pressures normal normal left atrium\par \par Today EKG shows normal sinus rhythm with IRBBB and frequent PVCs, not seen on prior. \par \par Labs reviewed from 12/29/20 k 4.7, creat 0.92, trig 199 (improved from 401), LDL 22, A1c 5.8 (improved)

## 2021-01-26 ENCOUNTER — APPOINTMENT (OUTPATIENT)
Dept: CARDIOLOGY | Facility: CLINIC | Age: 56
End: 2021-01-26
Payer: COMMERCIAL

## 2021-01-26 PROCEDURE — 99072 ADDL SUPL MATRL&STAF TM PHE: CPT

## 2021-01-26 PROCEDURE — 93306 TTE W/DOPPLER COMPLETE: CPT

## 2021-01-29 ENCOUNTER — APPOINTMENT (OUTPATIENT)
Dept: CARDIOLOGY | Facility: CLINIC | Age: 56
End: 2021-01-29
Payer: COMMERCIAL

## 2021-01-29 PROCEDURE — 93015 CV STRESS TEST SUPVJ I&R: CPT

## 2021-01-29 PROCEDURE — 78452 HT MUSCLE IMAGE SPECT MULT: CPT

## 2021-01-29 PROCEDURE — A9502: CPT

## 2021-02-04 ENCOUNTER — RX RENEWAL (OUTPATIENT)
Age: 56
End: 2021-02-04

## 2021-02-08 ENCOUNTER — APPOINTMENT (OUTPATIENT)
Dept: CARDIOLOGY | Facility: CLINIC | Age: 56
End: 2021-02-08
Payer: COMMERCIAL

## 2021-02-08 VITALS
WEIGHT: 196 LBS | TEMPERATURE: 96.9 F | HEART RATE: 81 BPM | BODY MASS INDEX: 30.7 KG/M2 | SYSTOLIC BLOOD PRESSURE: 110 MMHG | OXYGEN SATURATION: 97 % | DIASTOLIC BLOOD PRESSURE: 66 MMHG

## 2021-02-08 PROCEDURE — 99214 OFFICE O/P EST MOD 30 MIN: CPT

## 2021-02-08 PROCEDURE — 99072 ADDL SUPL MATRL&STAF TM PHE: CPT

## 2021-02-08 NOTE — REVIEW OF SYSTEMS
[see HPI] : see HPI [Dyspnea on exertion] : dyspnea during exertion [Chest Pain] : chest pain [Palpitations] : palpitations [Under Stress] : under stress [Negative] : Heme/Lymph [Joint Pain] : no joint pain [Joint Swelling] : no joint swelling [Joint Stiffness] : no joint stiffness [Muscle Cramps] : no muscle cramps [Limb Weakness (Paresis)] : no limb weakness

## 2021-02-08 NOTE — PHYSICAL EXAM
[General Appearance - Well Developed] : well developed [Normal Appearance] : normal appearance [Well Groomed] : well groomed [No Deformities] : no deformities [General Appearance - Well Nourished] : well nourished [General Appearance - In No Acute Distress] : no acute distress [Normal Conjunctiva] : the conjunctiva exhibited no abnormalities [Eyelids - No Xanthelasma] : the eyelids demonstrated no xanthelasmas [No Oral Cyanosis] : no oral cyanosis [No Oral Pallor] : no oral pallor [] : no respiratory distress [Respiration, Rhythm And Depth] : normal respiratory rhythm and effort [Exaggerated Use Of Accessory Muscles For Inspiration] : no accessory muscle use [Auscultation Breath Sounds / Voice Sounds] : lungs were clear to auscultation bilaterally [Heart Sounds] : normal S1 and S2 [Murmurs] : no murmurs present [Edema] : no peripheral edema present [Arterial Pulses Normal] : the arterial pulses were normal [Veins - Varicosity Changes] : no varicosital changes were noted in the lower extremities [Regular-Premature Beats] : the rhythm was regular with premature beats [Abdomen Soft] : soft [Abdomen Tenderness] : non-tender [Abnormal Walk] : normal gait [Nail Clubbing] : no clubbing of the fingernails [Gait - Sufficient For Exercise Testing] : the gait was sufficient for exercise testing [Cyanosis, Localized] : no localized cyanosis [Skin Color & Pigmentation] : normal skin color and pigmentation [Oriented To Time, Place, And Person] : oriented to person, place, and time [Affect] : the affect was normal [Mood] : the mood was normal [No Anxiety] : not feeling anxious [FreeTextEntry1] : no gallop/rub/heave/click, normal PMI. no bruits

## 2021-02-08 NOTE — DISCUSSION/SUMMARY
[FreeTextEntry1] : LISA DARNELL is a 55 year old M who presents today Feb 08, 2021 to review cardovascular testing. \par \par #1 PVCs, symptomatic at times. Seems to be aggravated in the setting of sleep deprivation. Pt is on beta blocker. Echo confirmed normal LVEF. No PVCs noted day of stress testing on EKGs. Holter monitor not done, however since pt has no further symptoms can defer at this time. He will call us if needed for placement if palps recur. Of note, he could not tolerate higher dose of BB d/t excess fatigue. \par \par #2 hypertensive heart disease. Mild left ventricular enlargement. Normal LV systolic function.  Continue present medication which includes valsartan and metoprolol.. Renal function and electrolytes are stable per recent labs.   He will continue with that at present. He is stable.  Goal less than 130/80.\par \par #3 hyperglycemia. Type 2 diabetes mellitus. A1c is now at goal 5.8, continue management with PCP. \par \par #4 coronary atherosclerosis. Native vessel. Prior PCI in 2015 to left circumflex. EKG shows PVCs. Reviewed nuclear stress testing with Dr. Linton today and discussed details with patient. No high risk features, small defect not seen on prone imaging less likely to be ischemia. Pt heavily exerted himself yesterday without chest pain or excess dyspnea. Reviewed limitations of noninvasive testing and if any new or worsening symptoms should occur advised him to notify our office immediately for further evaluation. He verbalizes understanding.  Continue aggressive lifestyle and risk factor modifications. Continue single antiplatelet agent and current lipid management. \par \par #5 hyperlipidemia.  Lipid management. Tolerating PCSK9 and vascepa and fenofibrate without significant adverse effect. Labs reviewed, excellent response. No changes to medications today. \par \par #6 continue abstinence from cigarettes.\par \par F/U 3 months for close monitoring of symptoms. \par Counseling regarding low saturated fat, salt and carbohydrate intake was reviewed. Active lifestyle and regular. Exercise along with weight management is advised.\par \par Sincerely,\par \par CADEN Herzog\par Patients history, testing, and plan reviewed with supervising MD: Dr. Nicholas Linton \par

## 2021-02-08 NOTE — REASON FOR VISIT
[Follow-Up - Clinic] : a clinic follow-up of [Chest Pain] : chest pain [Coronary Artery Disease] : coronary artery disease [Hyperlipidemia] : hyperlipidemia [Hypertension] : hypertension [Medication Management] : Medication management [FreeTextEntry1] : 55-year-old gentleman comes in for followup to review testing. \par \par PMH of\par CAD: The patient is status post PCI of the left circumflex. 10/15.  On beta blockers.  Preserved LV systolic function.  now On PCSK9 inhibitors.\par \par Dyslipidemia.  mixed. Could not tolerate Crestor. was on praluent and fish oil. tolerating it well.\par \par Hypertensive heart disease.  No CHF.  No CKD.  Past nicotine addiction. Stopped cigarette smoking.\par \par Type 2 diabetes mellitus. Improved A1c on tradjenta. \par \par \par He is recovered from COVID infection April 2020, had vaccine Dec 2020. 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 Recently has been lacking adequate sleep. He has nonspecific left sided chest pain presenting at rest, lasting seconds and resolving, nonexertional. No instigating or alleviating factors. Occasionally notes palpitations. Has mildly increased LUI. Denies any dizziness, near syncope, syncope. Denies orthopnea, edema, weight gain. \par \par Since I last saw him, with improved sleep he feels better. He shoveled snow in his driveway yesterday without exertional complaints. He only has palps after a long night shift. \par \par His echocardiogram 1/26/21 showed normal LV systolic function. Nuclear stress test 1/29/21 showed small inferoapical defect which was reversible and consistent with mild ischemia. However on further review with Dr. Linton today in the office, defect not seen on prone imaging, therefore less likely ischemia. Also noted some diaphragmatic attenuation. \par \par EKG 1/11/21 normal sinus rhythm with IRBBB and frequent PVCs, not seen on prior. He was coming off a 3 set of night shifts without adequate sleep at the time. \par \par Labs reviewed from 12/29/20 k 4.7, creat 0.92, trig 199 (improved from 401), LDL 22, A1c 5.8 (improved)

## 2021-05-24 ENCOUNTER — APPOINTMENT (OUTPATIENT)
Dept: CARDIOLOGY | Facility: CLINIC | Age: 56
End: 2021-05-24
Payer: COMMERCIAL

## 2021-05-24 ENCOUNTER — NON-APPOINTMENT (OUTPATIENT)
Age: 56
End: 2021-05-24

## 2021-05-24 VITALS
WEIGHT: 200 LBS | OXYGEN SATURATION: 98 % | BODY MASS INDEX: 31.39 KG/M2 | DIASTOLIC BLOOD PRESSURE: 78 MMHG | TEMPERATURE: 97.5 F | SYSTOLIC BLOOD PRESSURE: 120 MMHG | HEART RATE: 78 BPM | HEIGHT: 67 IN

## 2021-05-24 PROCEDURE — 99214 OFFICE O/P EST MOD 30 MIN: CPT

## 2021-05-24 PROCEDURE — 99406 BEHAV CHNG SMOKING 3-10 MIN: CPT

## 2021-05-24 PROCEDURE — 93000 ELECTROCARDIOGRAM COMPLETE: CPT

## 2021-05-24 PROCEDURE — 99072 ADDL SUPL MATRL&STAF TM PHE: CPT

## 2021-05-25 ENCOUNTER — NON-APPOINTMENT (OUTPATIENT)
Age: 56
End: 2021-05-25

## 2021-11-15 ENCOUNTER — NON-APPOINTMENT (OUTPATIENT)
Age: 56
End: 2021-11-15

## 2021-11-15 ENCOUNTER — APPOINTMENT (OUTPATIENT)
Dept: CARDIOLOGY | Facility: CLINIC | Age: 56
End: 2021-11-15
Payer: COMMERCIAL

## 2021-11-15 VITALS
WEIGHT: 202 LBS | HEART RATE: 69 BPM | OXYGEN SATURATION: 96 % | BODY MASS INDEX: 31.71 KG/M2 | DIASTOLIC BLOOD PRESSURE: 62 MMHG | HEIGHT: 67 IN | SYSTOLIC BLOOD PRESSURE: 110 MMHG

## 2021-11-15 PROCEDURE — 93000 ELECTROCARDIOGRAM COMPLETE: CPT

## 2021-11-15 PROCEDURE — 99214 OFFICE O/P EST MOD 30 MIN: CPT

## 2021-11-15 RX ORDER — ICOSAPENT ETHYL 1000 MG/1
1 CAPSULE ORAL TWICE DAILY
Qty: 360 | Refills: 3 | Status: DISCONTINUED | COMMUNITY
Start: 2021-05-24 | End: 2021-11-15

## 2021-11-15 NOTE — PHYSICAL EXAM
[General Appearance - Well Developed] : well developed [Normal Appearance] : normal appearance [Well Groomed] : well groomed [General Appearance - Well Nourished] : well nourished [No Deformities] : no deformities [General Appearance - In No Acute Distress] : no acute distress [Normal Conjunctiva] : the conjunctiva exhibited no abnormalities [Eyelids - No Xanthelasma] : the eyelids demonstrated no xanthelasmas [No Oral Pallor] : no oral pallor [No Oral Cyanosis] : no oral cyanosis [] : no respiratory distress [Respiration, Rhythm And Depth] : normal respiratory rhythm and effort [Exaggerated Use Of Accessory Muscles For Inspiration] : no accessory muscle use [Auscultation Breath Sounds / Voice Sounds] : lungs were clear to auscultation bilaterally [Heart Rate And Rhythm] : heart rate and rhythm were normal [Heart Sounds] : normal S1 and S2 [Murmurs] : no murmurs present [Arterial Pulses Normal] : the arterial pulses were normal [Veins - Varicosity Changes] : no varicosital changes were noted in the lower extremities [Edema] : no peripheral edema present [Abdomen Soft] : soft [Abdomen Tenderness] : non-tender [Abnormal Walk] : normal gait [Gait - Sufficient For Exercise Testing] : the gait was sufficient for exercise testing [Nail Clubbing] : no clubbing of the fingernails [Cyanosis, Localized] : no localized cyanosis [Skin Color & Pigmentation] : normal skin color and pigmentation [Oriented To Time, Place, And Person] : oriented to person, place, and time [Affect] : the affect was normal [Mood] : the mood was normal [No Anxiety] : not feeling anxious [FreeTextEntry1] : no gallop/rub/heave/click, normal PMI. no bruits

## 2021-11-15 NOTE — ASSESSMENT
[FreeTextEntry1] : \par  Past test for reference.\par \par Coronary angiogram. October 2015. LV ejection fraction 55%. LCx 90% treated with drug-eluting stent placed. Mid RCA 10-30%.\par \par Echocardiogram. 12/1/2014. EF 55% PMR left atrial enlargement. l left ventricular hypertrophy. Mild diastolic LV dysfunction. Difficult study. Trace to mild mitral and tricuspid regurgitation.\par \par Carotid Doppler study. 12 2014. Mild nonobstructive disease bilaterally.\par \par EKG ordered and interpreted by me on 1219 2016 reviewed. Indication coronary artery disease. Interpretation by me. Normal sinus rhythm. Incomplete right bundle branch block\par \par echocardiogram with contrast enhancement was ordered and performed on 5/3/17. Demonstrated EF 60%, mild left ventricular enlargement with normal LV systolic function. \par \par Carotid Doppler, syncope, April 4, 2017. Intimal thickening.\par Echocardiogram. April 4, 2017. Technically very difficult study. Possible left ventricular enlargement. Possible to preserve his systolic function, though difficult to evaluate wall motion. There is borderline pulmonary pressures. There was mild mitral regurgitation.\par \par EKG ordered and interpreted by me on February 12, 2018. Indication atypical chest pain. According arteries is being dictation. Sinus bradycardia. Nonspecific ST-T changes.\par \par Stress myocardial perfusion scan. March 9, 2018. 9 minutes of [place] protocol. 93%. 10 mets. No chest pain. Stable. Blood pressure. Nonischemic EKG. Small mild defect made anteroseptal region, suggesting a possible infarct. Ejection fraction 59%.\par \par Reviewed on November 20, 2018\par EKG, chest x-ray, left upper extremity DVT study, radiology consultation report, and labs from November 13 in November 14, hospital admission, were reviewed from cardiology perspective.\par \par Reviewed on December 30th 2019\par EKG. Normal sinus rhythm. ST, T changes suggestive of ischemia. No significant worsening of changes, which are chronic.\par Labs, which were done recently on November 13 was 19 and showed a stable CBC, CMP. Triglycerides 234, total cholesterol 127, HDL 44, LDL 36. Hemoglobin A1c 6.6.\par \par Reviewed on June 11, 2020\par Echocardiogram January 17, 2020 preserved LV systolic function EF around 60 to 65% minimal to mild mitral regurgitation pulmonary pressures normal normal left atrium\par LabsFebruary 26, 2020 were reviewed next COVID-19 PCR test April 6, 2020 was positive\par \par Reviewed November 15, 2021\par EKG as noted above\par Labs from April 2021 were reviewed\par Triglycerides 153 LDL 39 HDL 45.  CMP stable CBC normal

## 2021-11-15 NOTE — DISCUSSION/SUMMARY
[FreeTextEntry1] : 56-year-old gentleman with above medical history and active medical problems as noted below \par #1  Chest pain.  Most likely noncardiac.  EKG no acute changes.  Reviewed high risk symptoms to notify us immediately or call 911.  Understands limitation of evaluation management.  Reviewed prior cardiovascular tests including stress myocardial perfusion scan from the January.  Limitation of evaluation discussed.  Any worsening symptoms with exertion or other associated symptoms still suggest possibility of obstructive coronary artery disease he would benefit from invasive coronary angiography guided therapy.\par #2 hypertensive heart disease. Mild left ventricular enlargement. Normal LV systolic function.  Continue present medication which includes valsartan and metoprolol..  He will continue with that at present. He is stable. Blood pressure. Understands risk and benefits.  Goal less than 130/80.\par #3 hyperglycemia. Type 2 diabetes mellitus. Uncomplicated. At present. Counseling regarding dietary restrictions regular activity and exercise was done. Goals have been reviewed.\par #4 coronary atherosclerosis. Native vessel. Prior PCI in 2015 to left circumflex.  Management as discussed above\par Continue aggressive lifestyle and risk factor modifications.\par Continue single antiplatelet agent\par #5 hyperlipidemia.  Mixed lipid management. Tolerating PCSK9 , inhibitor without significant adverse effect. Follow labs.  He is also on Vascepa and fenofibrate.\par Importance of management of triglycerides were also reviewed.\par #6 continue abstinence from cigarettes.\par \par Counseling regarding low saturated fat, salt and carbohydrate intake was reviewed. Active lifestyle and regular. Exercise along with weight management is advised.\par \par All the above were at length reviewed. Answered all the questions. Thank you very much for this kind referral. Please do not hesitate to give me a call for any question.\par Part of this transcription was done with voice recognition software and phonetically similar errors are common. I apologize for that. Please donot hesitate to call for any questions due to above.\par \par f/u after 6 months or for any change in symptoms

## 2021-11-15 NOTE — REASON FOR VISIT
[Symptom and Test Evaluation] : symptom and test evaluation [Hyperlipidemia] : hyperlipidemia [Hypertension] : hypertension [Coronary Artery Disease] : coronary artery disease [FreeTextEntry3] : Dr. Louie [FreeTextEntry1] : 56-year-old gentleman comes in for follow-up consultation.  Since last seen he has been doing fairly well except significant allergic symptoms.\par He has intermittent brief episodes of localized nonexertional left parasternal chest pain without any radiation or other associated symptoms.  He attributes that to allergies.  He has been able to do good activity and exercise without any significant symptoms.  Reproducibility noted.  Similar as noted in the past.  He had a nuclear myocardial perfusion scan in January which was stable done for similar reason.\par He has stable dyspnea on exertion, but without any PND, orthopnea, or pedal edema.\par His dietary restriction and weight have remained stable.\par No recent hospitalization. \par No claudication pain.\par Tolerating his medications well

## 2021-11-15 NOTE — CARDIOLOGY SUMMARY
[de-identified] : EKG 1/11/21 normal sinus rhythm with IRBBB and frequent PVCs, not seen on prior. He was coming off a 3 set of night shifts without adequate sleep at the time. \par EKG.  November 15, 2021.  Normal sinus rhythm.  Nonspecific ST-T changes. [de-identified] :  Nuclear stress test 1/29/21 showed small inferoapical defect which was reversible and consistent with mild ischemia. However on further review  defect not seen on prone imaging, therefore less likely ischemia. Also noted some diaphragmatic attenuation. \par  [de-identified] : His echocardiogram 1/26/21 showed normal LV systolic function.\par

## 2021-11-15 NOTE — REVIEW OF SYSTEMS
[Negative] : Heme/Lymph [Dyspnea on exertion] : dyspnea during exertion [Chest Discomfort] : chest discomfort [Under Stress] : under stress [SOB] : no shortness of breath [Lower Ext Edema] : no extremity edema [Leg Claudication] : no intermittent leg claudication [Palpitations] : no palpitations [Orthopnea] : no orthopnea [PND] : no PND [Syncope] : no syncope

## 2021-12-05 ENCOUNTER — EMERGENCY (EMERGENCY)
Facility: HOSPITAL | Age: 56
LOS: 1 days | End: 2021-12-05
Admitting: EMERGENCY MEDICINE
Payer: COMMERCIAL

## 2021-12-05 PROCEDURE — 99284 EMERGENCY DEPT VISIT MOD MDM: CPT

## 2022-03-03 ENCOUNTER — RX RENEWAL (OUTPATIENT)
Age: 57
End: 2022-03-03

## 2022-03-25 ENCOUNTER — RX RENEWAL (OUTPATIENT)
Age: 57
End: 2022-03-25

## 2022-05-02 ENCOUNTER — APPOINTMENT (OUTPATIENT)
Dept: CARDIOLOGY | Facility: CLINIC | Age: 57
End: 2022-05-02
Payer: COMMERCIAL

## 2022-05-02 VITALS
SYSTOLIC BLOOD PRESSURE: 120 MMHG | BODY MASS INDEX: 31.95 KG/M2 | DIASTOLIC BLOOD PRESSURE: 64 MMHG | TEMPERATURE: 98.2 F | HEART RATE: 82 BPM | WEIGHT: 204 LBS | OXYGEN SATURATION: 98 %

## 2022-05-02 DIAGNOSIS — E11.9 TYPE 2 DIABETES MELLITUS W/OUT COMPLICATIONS: ICD-10-CM

## 2022-05-02 PROCEDURE — 99214 OFFICE O/P EST MOD 30 MIN: CPT

## 2022-05-02 RX ORDER — PERMETHRIN 50 MG/G
5 CREAM TOPICAL
Qty: 60 | Refills: 0 | Status: DISCONTINUED | COMMUNITY
Start: 2022-01-14 | End: 2022-05-02

## 2022-05-02 RX ORDER — TRIAMCINOLONE ACETONIDE 1 MG/G
0.1 OINTMENT TOPICAL
Qty: 454 | Refills: 0 | Status: DISCONTINUED | COMMUNITY
Start: 2021-10-18 | End: 2022-05-02

## 2022-05-02 RX ORDER — IVERMECTIN 3 MG/1
3 TABLET ORAL
Qty: 12 | Refills: 0 | Status: DISCONTINUED | COMMUNITY
Start: 2022-01-14 | End: 2022-05-02

## 2022-05-02 RX ORDER — CLOBETASOL PROPIONATE 0.5 MG/G
0.05 OINTMENT TOPICAL
Qty: 15 | Refills: 0 | Status: DISCONTINUED | COMMUNITY
Start: 2022-01-14 | End: 2022-05-02

## 2022-05-02 RX ORDER — TRIAMCINOLONE ACETONIDE 1 MG/G
0.1 CREAM TOPICAL
Qty: 80 | Refills: 0 | Status: DISCONTINUED | COMMUNITY
Start: 2021-06-25 | End: 2022-05-02

## 2022-05-02 RX ORDER — KETOCONAZOLE 20.5 MG/ML
2 SHAMPOO, SUSPENSION TOPICAL
Qty: 120 | Refills: 0 | Status: DISCONTINUED | COMMUNITY
Start: 2021-12-30 | End: 2022-05-02

## 2022-05-02 NOTE — CARDIOLOGY SUMMARY
[de-identified] : EKG 1/11/21 normal sinus rhythm with IRBBB and frequent PVCs, not seen on prior. He was coming off a 3 set of night shifts without adequate sleep at the time. \par EKG.  November 15, 2021.  Normal sinus rhythm.  Nonspecific ST-T changes. [de-identified] :  Nuclear stress test 1/29/21 showed small inferoapical defect which was reversible and consistent with mild ischemia. However on further review  defect not seen on prone imaging, therefore less likely ischemia. Also noted some diaphragmatic attenuation. \par  [de-identified] : Echocardiogram 1/26/21 showed normal LV systolic function.\par  [de-identified] : Coronary angiogram. October 2015. LV ejection fraction 55%. LCx 90% treated with drug-eluting stent placed. Mid RCA 10-30%.\par  [de-identified] : Carotid US 4/4/17 intimal thickening

## 2022-05-02 NOTE — DISCUSSION/SUMMARY
[FreeTextEntry1] : LISA DARNELL  is a 56 year M  who presents today May 2, 2022 with the above history and the following active issues. \par \par Hypertensive heart disease. Mild left ventricular enlargement. Normal LV systolic function.  Continue present medication which includes valsartan and metoprolol..  He will continue with that at present. He is stable. Blood pressure. Understands risk and benefits.  Goal less than 130/80.\par \par Type 2 diabetes mellitus. Uncomplicated. At present. Counseling regarding dietary restrictions regular activity and exercise was done. Goals have been reviewed.\par \par Coronary atherosclerosis. Native vessel. Prior PCI in 2015 to left circumflex.  \par Continue aggressive lifestyle and risk factor modifications.\par Continue single antiplatelet agent\par \par Hyperlipidemia.  Mixed lipid management. Tolerating PCSK9 , inhibitor without significant adverse effect. Follow labs.  He is also on Vascepa and fenofibrate.\par Importance of management of triglycerides were also reviewed.\par \par Continue abstinence from cigarettes.\par \par Red flag symptoms which would warrant sooner emergent evaluation reviewed with the patient. \par Questions and concerns were addressed and answered. \par \par Sincerely,\par \par Yodit Anaya PA-C\par Patients history, testing and plan reviewed with supervising MD: Dr. Levy Stanford

## 2022-05-02 NOTE — HISTORY OF PRESENT ILLNESS
[FreeTextEntry1] : LISA DARNELL  is a 56 year M  who presents today May 2, 2022 in clinical follow-up.  Overall he has been feeling well. There has been no recent illness or hospital stay. Medications have remained unchanged. Asymptomatic from cardiovascular and arrhythmia standpoint.  Active with no new exertional complaints. \par Today he denies chest pain, pressure, unusual shortness of breath, lightheadedness, dizziness, near syncope or syncope. \par \par As you know his other active medical problems are as listed below.\par \par CAD: The patient is status post PCI of the left circumflex. 10/15.  Now the patient states he feels much better. Patient has no exertional chest discomfort or shortness of breath. CCS class 0.    On beta blockers.  Preserved LV systolic function.  now On PCSK9 inhibitors.\par \par Dyslipidemia.  \par mixed. Could not tolerate Crestor. was on praluent and fish oil. tolerating it well.\par \par Hypertensive heart disease.  No CHF.  No CKD.  Nicotine addiction. Stopped cigarette smoking.\par \par Type 2 diabetes mellitus\par

## 2022-05-02 NOTE — REVIEW OF SYSTEMS
[Under Stress] : under stress [Negative] : Heme/Lymph [SOB] : no shortness of breath [Dyspnea on exertion] : not dyspnea during exertion [Chest Discomfort] : no chest discomfort [Lower Ext Edema] : no extremity edema [Leg Claudication] : no intermittent leg claudication [Palpitations] : no palpitations [Orthopnea] : no orthopnea [PND] : no PND [Syncope] : no syncope

## 2022-05-02 NOTE — REASON FOR VISIT
[Symptom and Test Evaluation] : symptom and test evaluation [Hyperlipidemia] : hyperlipidemia [Hypertension] : hypertension [Coronary Artery Disease] : coronary artery disease [FreeTextEntry3] : Dr. Louie

## 2022-06-21 ENCOUNTER — NON-APPOINTMENT (OUTPATIENT)
Age: 57
End: 2022-06-21

## 2022-07-21 ENCOUNTER — RX RENEWAL (OUTPATIENT)
Age: 57
End: 2022-07-21

## 2022-10-18 ENCOUNTER — NON-APPOINTMENT (OUTPATIENT)
Age: 57
End: 2022-10-18

## 2022-11-07 ENCOUNTER — APPOINTMENT (OUTPATIENT)
Dept: CARDIOLOGY | Facility: CLINIC | Age: 57
End: 2022-11-07

## 2022-11-07 ENCOUNTER — NON-APPOINTMENT (OUTPATIENT)
Age: 57
End: 2022-11-07

## 2022-11-07 VITALS
HEART RATE: 89 BPM | BODY MASS INDEX: 31.71 KG/M2 | HEIGHT: 67 IN | OXYGEN SATURATION: 98 % | WEIGHT: 202 LBS | DIASTOLIC BLOOD PRESSURE: 60 MMHG | SYSTOLIC BLOOD PRESSURE: 122 MMHG

## 2022-11-07 PROCEDURE — 99214 OFFICE O/P EST MOD 30 MIN: CPT | Mod: 25

## 2022-11-07 PROCEDURE — 93000 ELECTROCARDIOGRAM COMPLETE: CPT

## 2022-11-07 RX ORDER — FEXOFENADINE HYDROCHLORIDE 180 MG/1
180 TABLET, FILM COATED ORAL DAILY
Refills: 0 | Status: DISCONTINUED | COMMUNITY
End: 2022-11-07

## 2022-11-07 RX ORDER — PANTOPRAZOLE 40 MG/1
40 TABLET, DELAYED RELEASE ORAL
Qty: 30 | Refills: 0 | Status: ACTIVE | COMMUNITY
Start: 2022-11-02

## 2022-11-07 RX ORDER — OMEPRAZOLE 20 MG/1
20 CAPSULE, DELAYED RELEASE ORAL
Qty: 30 | Refills: 0 | Status: DISCONTINUED | COMMUNITY
Start: 2019-09-23 | End: 2022-11-07

## 2022-11-07 NOTE — PHYSICAL EXAM
[Well Developed] : well developed [Well Nourished] : well nourished [No Acute Distress] : no acute distress [Normal Venous Pressure] : normal venous pressure [No Carotid Bruit] : no carotid bruit [Normal S1, S2] : normal S1, S2 [No Murmur] : no murmur [No Rub] : no rub [No Gallop] : no gallop [Clear Lung Fields] : clear lung fields [Good Air Entry] : good air entry [No Respiratory Distress] : no respiratory distress  [Soft] : abdomen soft [Normal Gait] : normal gait [No Edema] : no edema [No Cyanosis] : no cyanosis [No Clubbing] : no clubbing [No Varicosities] : no varicosities [Moves all extremities] : moves all extremities [Normal Speech] : normal speech [Alert and Oriented] : alert and oriented [Normal Radial B/L] : normal radial B/L [de-identified] : Left subcostal mild tenderness

## 2022-11-07 NOTE — DISCUSSION/SUMMARY
[FreeTextEntry1] : LISA DARNELL  is a 57 year M  who presents today November 7 , 2022 with the above history and the following active issues. \par \par Hypertensive heart disease. Mild left ventricular enlargement. Normal LV systolic function.  Continue present medication which includes valsartan and metoprolol..  He will continue with that at present. He is stable. Blood pressure. Understands risk and benefits.  Goal less than 130/80.\par \par Type 2 diabetes mellitus. Uncomplicated. At present. Counseling regarding dietary restrictions regular activity and exercise was done. Goals have been reviewed.\par \par Coronary atherosclerosis. Native vessel. Prior PCI in 2015 to left circumflex.  \par Continue aggressive lifestyle and risk factor modifications.  Secondary prevention.\par Continue single antiplatelet agent\par \par Hyperlipidemia.  Mixed lipid management. Tolerating PCSK9 , inhibitor without significant adverse effect. Follow labs.  He is also on Vascepa and fenofibrate.  Follow labs closely\par Importance of management of triglycerides were also reviewed.\par \par Intermittent vaping.\par Smoking cessation. Counseling done. Relationship with ASCVD, and associated morbidity, mortality was reviewed. Options available discussed.\par \par Abdominal pain evaluation management with your office\par \par Counseling regarding low saturated fat, salt and carbohydrate intake was reviewed. Active lifestyle and regular. Exercise along with weight management is advised.\par All the above were at length reviewed. Answered all the questions. Thank you very much for this kind referral. Please do not hesitate to give me a call for any question.\par Part of this transcription was done with voice recognition software and phonetically similar errors are common. I apologize for that. Please do not hesitate to call for any questions due to above.\par \par Sincerely,\par Levy Stanford MD,FACC,JOSSY\par \par

## 2022-11-07 NOTE — HISTORY OF PRESENT ILLNESS
[FreeTextEntry1] : LISA DARNELL  is a 57-year-old male comes in today on November 7, 2022 in clinical follow-up.  Overall he has been feeling well. There has been no recent illness or hospital stay. Asymptomatic from cardiovascular and arrhythmia standpoint.  Active with no new exertional complaints. \par Today he denies chest pain, pressure, unusual shortness of breath, lightheadedness, dizziness, near syncope or syncope. \par He does have left subcostal nagging pain intermittent with belching for the last few days.  Being treated with PPI and being evaluated with your office.  According to him he had urinary tests recently.\par \par As you know his other active medical problems are as listed below.\par \par CAD: The patient is status post PCI of the left circumflex. 10/15.  Now the patient states he feels much better. Patient has no exertional chest discomfort or shortness of breath. CCS class 0.    On beta blockers.  Preserved LV systolic function.  now On PCSK9 inhibitors.\par \par Dyslipidemia.  \par mixed. Could not tolerate Crestor. was on praluent and fish oil. tolerating it well.\par \par Hypertensive heart disease.  No CHF.  No CKD.  Nicotine addiction. Stopped cigarette smoking.\par \par Type 2 diabetes mellitus\par

## 2022-11-07 NOTE — ASSESSMENT
[FreeTextEntry1] : Reviewed on November 7, 2022 EKG as noted above\par Labs from August had shown stable CBC.  CMP with creatinine 0.95 sodium 136 potassium 3.8 LDL 33 HDL 40 triglycerides 174 hemoglobin A1c 6.1 LFT stable

## 2022-11-07 NOTE — REVIEW OF SYSTEMS
[Under Stress] : under stress [Negative] : Heme/Lymph [SOB] : no shortness of breath [Dyspnea on exertion] : not dyspnea during exertion [Chest Discomfort] : no chest discomfort [Lower Ext Edema] : no extremity edema [Leg Claudication] : no intermittent leg claudication [Palpitations] : no palpitations [Orthopnea] : no orthopnea [PND] : no PND [Syncope] : no syncope [FreeTextEntry5] : As per HPI [FreeTextEntry7] : As per HPI

## 2022-11-07 NOTE — CARDIOLOGY SUMMARY
[de-identified] : EKG 1/11/21 normal sinus rhythm with IRBBB and frequent PVCs, not seen on prior. He was coming off a 3 set of night shifts without adequate sleep at the time. \par EKG.  November 15, 2021.  Normal sinus rhythm.  Nonspecific ST-T changes.\par November 7, 2022 normal sinus rhythm nonspecific ST-T changes [de-identified] :  Nuclear stress test 1/29/21 showed small inferoapical defect which was reversible and consistent with mild ischemia. However on further review  defect not seen on prone imaging, therefore less likely ischemia. Also noted some diaphragmatic attenuation. \par  [de-identified] : Echocardiogram 1/26/21 showed normal LV systolic function.\par  [de-identified] : Coronary angiogram. October 2015. LV ejection fraction 55%. LCx 90% treated with drug-eluting stent placed. Mid RCA 10-30%.\par  [de-identified] : Carotid US 4/4/17 intimal thickening

## 2022-11-16 ENCOUNTER — RX RENEWAL (OUTPATIENT)
Age: 57
End: 2022-11-16

## 2022-11-29 ENCOUNTER — NON-APPOINTMENT (OUTPATIENT)
Age: 57
End: 2022-11-29

## 2023-01-05 ENCOUNTER — RX RENEWAL (OUTPATIENT)
Age: 58
End: 2023-01-05

## 2023-01-31 ENCOUNTER — RX RENEWAL (OUTPATIENT)
Age: 58
End: 2023-01-31

## 2023-02-24 ENCOUNTER — RX RENEWAL (OUTPATIENT)
Age: 58
End: 2023-02-24

## 2023-02-28 ENCOUNTER — RX RENEWAL (OUTPATIENT)
Age: 58
End: 2023-02-28

## 2023-05-08 ENCOUNTER — APPOINTMENT (OUTPATIENT)
Dept: CARDIOLOGY | Facility: CLINIC | Age: 58
End: 2023-05-08
Payer: COMMERCIAL

## 2023-05-08 VITALS
WEIGHT: 196 LBS | DIASTOLIC BLOOD PRESSURE: 66 MMHG | OXYGEN SATURATION: 95 % | SYSTOLIC BLOOD PRESSURE: 116 MMHG | BODY MASS INDEX: 30.76 KG/M2 | HEART RATE: 66 BPM | HEIGHT: 67 IN

## 2023-05-08 DIAGNOSIS — R07.89 OTHER CHEST PAIN: ICD-10-CM

## 2023-05-08 PROCEDURE — 99214 OFFICE O/P EST MOD 30 MIN: CPT

## 2023-05-08 RX ORDER — TOPIRAMATE 25 MG/1
25 TABLET, FILM COATED ORAL
Qty: 30 | Refills: 0 | Status: DISCONTINUED | COMMUNITY
Start: 2022-08-19 | End: 2023-05-08

## 2023-05-08 RX ORDER — FENOFIBRATE 54 MG/1
54 TABLET ORAL DAILY
Qty: 90 | Refills: 3 | Status: DISCONTINUED | COMMUNITY
Start: 2020-10-07 | End: 2023-05-08

## 2023-05-08 RX ORDER — METFORMIN HYDROCHLORIDE 500 MG/1
500 TABLET, COATED ORAL TWICE DAILY
Refills: 0 | Status: DISCONTINUED | COMMUNITY
Start: 2022-01-31 | End: 2023-05-08

## 2023-05-08 RX ORDER — LINAGLIPTIN 5 MG/1
5 TABLET, FILM COATED ORAL DAILY
Refills: 0 | Status: DISCONTINUED | COMMUNITY
End: 2023-05-08

## 2023-05-08 NOTE — PHYSICAL EXAM
[Well Developed] : well developed [Well Nourished] : well nourished [No Acute Distress] : no acute distress [Normal Venous Pressure] : normal venous pressure [No Carotid Bruit] : no carotid bruit [Normal S1, S2] : normal S1, S2 [No Murmur] : no murmur [No Rub] : no rub [No Gallop] : no gallop [Clear Lung Fields] : clear lung fields [Good Air Entry] : good air entry [No Respiratory Distress] : no respiratory distress  [Soft] : abdomen soft [Normal Gait] : normal gait [No Edema] : no edema [No Cyanosis] : no cyanosis [No Clubbing] : no clubbing [No Varicosities] : no varicosities [Normal Radial B/L] : normal radial B/L [Moves all extremities] : moves all extremities [Normal Speech] : normal speech [Alert and Oriented] : alert and oriented [de-identified] : Left subcostal mild tenderness

## 2023-05-08 NOTE — CARDIOLOGY SUMMARY
[de-identified] : EKG 1/11/21 normal sinus rhythm with IRBBB and frequent PVCs, not seen on prior. He was coming off a 3 set of night shifts without adequate sleep at the time. \par EKG.  November 15, 2021.  Normal sinus rhythm.  Nonspecific ST-T changes.\par November 7, 2022 normal sinus rhythm nonspecific ST-T changes [de-identified] :  Nuclear stress test 1/29/21 showed small inferoapical defect which was reversible and consistent with mild ischemia. However on further review  defect not seen on prone imaging, therefore less likely ischemia. Also noted some diaphragmatic attenuation. \par  [de-identified] : Echocardiogram 1/26/21 showed normal LV systolic function.\par  [de-identified] : Coronary angiogram. October 2015. LV ejection fraction 55%. LCx 90% treated with drug-eluting stent placed. Mid RCA 10-30%.\par  [de-identified] : Carotid US 4/4/17 intimal thickening

## 2023-05-08 NOTE — HISTORY OF PRESENT ILLNESS
[FreeTextEntry1] : LISA DARNELL  is a 57-year-old male comes in today on  in clinical follow-up.  Overall he has been feeling well. There has been no recent illness or hospital stay. Asymptomatic from cardiovascular and arrhythmia standpoint.  Active with no new exertional complaints. \par Today he denies chest pain, pressure, unusual shortness of breath, lightheadedness, dizziness, near syncope or syncope. \par His metformin was decreased to 500 mg p.o. his dietary restrictions were not as good since last hemoglobin A1c 5.5.  He has remained very active\par \par As you know his other active medical problems are as listed below.\par \par CAD: The patient is status post PCI of the left circumflex. 10/15.  Now the patient states he feels much better. Patient has no exertional chest discomfort or shortness of breath. CCS class 0.    On beta blockers.  Preserved LV systolic function.  now On PCSK9 inhibitors.\par \par Dyslipidemia.  \par mixed. Could not tolerate Crestor. was on praluent and fish oil. tolerating it well.\par \par Hypertensive heart disease.  No CHF.  No CKD.  Nicotine addiction. Stopped cigarette smoking.\par \par Type 2 diabetes mellitus\par

## 2023-05-08 NOTE — DISCUSSION/SUMMARY
[FreeTextEntry1] : LISA DARNELL  is a 57 year M  who presents today with the above history and the following active issues. \par \par Hypertensive heart disease. Mild left ventricular enlargement. Normal LV systolic function.  Continue present medication which includes valsartan and metoprolol..  He will continue with that at present. He is stable. Blood pressure. Understands risk and benefits.  Goal less than 130/80.\par \par Type 2 diabetes mellitus. Uncomplicated. At present.  ASCVD.  Counseling regarding dietary restrictions regular activity and exercise was done. Goals have been reviewed.\par \par Coronary atherosclerosis. Native vessel. Prior PCI in 2015 to left circumflex.  \par Continue aggressive lifestyle and risk factor modifications.  Secondary prevention.\par Continue single antiplatelet agent continue with PCSK9 inhibitor.  LDL cholesterol less than 50.\par \par Hyperlipidemia.  Mixed lipid management. Tolerating PCSK9 , inhibitor without significant adverse effect. Follow labs.  He is also on Vascepa and fenofibrate.  At this point we will stop fenofibrate.  We will see aggressive lifestyle modifications with diet helps and minimizing need for use of fenofibrate.  Follow labs closely\par Risk benefits alternatives were reviewed.\par \par Intermittent vaping.\par Smoking cessation. Counseling done. Relationship with ASCVD, and associated morbidity, mortality was reviewed. Options available discussed.\par \par \par Counseling regarding low saturated fat, salt and carbohydrate intake was reviewed. Active lifestyle and regular. Exercise along with weight management is advised.\par All the above were at length reviewed. Answered all the questions. Thank you very much for this kind referral. Please do not hesitate to give me a call for any question.\par Part of this transcription was done with voice recognition software and phonetically similar errors are common. I apologize for that. Please do not hesitate to call for any questions due to above.\par \par Sincerely,\par Levy Stanford MD,FACC,JOSSY\par \par

## 2023-05-08 NOTE — ASSESSMENT
[FreeTextEntry1] : Reviewed on November 7, 2022 EKG as noted above\par Labs from August had shown stable CBC.  CMP with creatinine 0.95 sodium 136 potassium 3.8 LDL 33 HDL 40 triglycerides 174 hemoglobin A1c 6.1 LFT stable\par \par Reviewed on May 8, 2023.  Labs May 2, 2023 creatinine 0.8 sodium 137 potassium 4.2 hemoglobin A1c 6.2 triglycerides 154 HDL 40 LDL 38 total cholesterol 104 hemoglobin A1c 6.2

## 2023-07-03 ENCOUNTER — NON-APPOINTMENT (OUTPATIENT)
Age: 58
End: 2023-07-03

## 2023-10-13 ENCOUNTER — RX RENEWAL (OUTPATIENT)
Age: 58
End: 2023-10-13

## 2023-10-13 RX ORDER — VALSARTAN 160 MG/1
160 TABLET, COATED ORAL
Qty: 90 | Refills: 3 | Status: ACTIVE | COMMUNITY
Start: 2019-10-04 | End: 1900-01-01

## 2023-11-06 ENCOUNTER — NON-APPOINTMENT (OUTPATIENT)
Age: 58
End: 2023-11-06

## 2023-11-06 ENCOUNTER — APPOINTMENT (OUTPATIENT)
Dept: CARDIOLOGY | Facility: CLINIC | Age: 58
End: 2023-11-06
Payer: COMMERCIAL

## 2023-11-06 VITALS
HEART RATE: 64 BPM | OXYGEN SATURATION: 96 % | HEIGHT: 67 IN | BODY MASS INDEX: 31.39 KG/M2 | SYSTOLIC BLOOD PRESSURE: 128 MMHG | WEIGHT: 200 LBS | DIASTOLIC BLOOD PRESSURE: 78 MMHG

## 2023-11-06 PROCEDURE — 99214 OFFICE O/P EST MOD 30 MIN: CPT | Mod: 25

## 2023-11-06 PROCEDURE — 93000 ELECTROCARDIOGRAM COMPLETE: CPT

## 2023-11-14 ENCOUNTER — APPOINTMENT (OUTPATIENT)
Dept: CARDIOLOGY | Facility: CLINIC | Age: 58
End: 2023-11-14
Payer: COMMERCIAL

## 2023-11-14 PROCEDURE — 93306 TTE W/DOPPLER COMPLETE: CPT

## 2023-11-27 ENCOUNTER — APPOINTMENT (OUTPATIENT)
Dept: CARDIOLOGY | Facility: CLINIC | Age: 58
End: 2023-11-27
Payer: COMMERCIAL

## 2023-11-27 ENCOUNTER — NON-APPOINTMENT (OUTPATIENT)
Age: 58
End: 2023-11-27

## 2023-11-27 DIAGNOSIS — I51.7 CARDIOMEGALY: ICD-10-CM

## 2023-11-27 LAB — HBA1C MFR BLD HPLC: 6.4

## 2023-11-27 PROCEDURE — 93015 CV STRESS TEST SUPVJ I&R: CPT

## 2023-12-08 ENCOUNTER — RX RENEWAL (OUTPATIENT)
Age: 58
End: 2023-12-08

## 2023-12-28 ENCOUNTER — APPOINTMENT (OUTPATIENT)
Dept: CARDIOLOGY | Facility: CLINIC | Age: 58
End: 2023-12-28
Payer: COMMERCIAL

## 2023-12-28 VITALS
DIASTOLIC BLOOD PRESSURE: 78 MMHG | SYSTOLIC BLOOD PRESSURE: 126 MMHG | WEIGHT: 200 LBS | OXYGEN SATURATION: 97 % | HEART RATE: 70 BPM | HEIGHT: 67 IN | BODY MASS INDEX: 31.39 KG/M2

## 2023-12-28 DIAGNOSIS — I25.118 ATHEROSCLEROTIC HEART DISEASE OF NATIVE CORONARY ARTERY WITH OTHER FORMS OF ANGINA PECTORIS: ICD-10-CM

## 2023-12-28 DIAGNOSIS — R93.1 ABNORMAL FINDINGS ON DIAGNOSTIC IMAGING OF HEART AND CORONARY CIRCULATION: ICD-10-CM

## 2023-12-28 DIAGNOSIS — Z78.9 OTHER SPECIFIED HEALTH STATUS: ICD-10-CM

## 2023-12-28 PROCEDURE — 99214 OFFICE O/P EST MOD 30 MIN: CPT

## 2023-12-28 NOTE — HISTORY OF PRESENT ILLNESS
[FreeTextEntry1] : She has no chest pain She has no shortness of breath She has no palpitations She has no syncope She is neurologically intact She has no edema She has no skin rashes2

## 2023-12-28 NOTE — REASON FOR VISIT
[Symptom and Test Evaluation] : symptom and test evaluation [Hyperlipidemia] : hyperlipidemia [Hypertension] : hypertension [Coronary Artery Disease] : coronary artery disease [FreeTextEntry3] : Dr. Louie [FreeTextEntry1] : I saw this  58-year-old gentleman in post cath follow-up on  12/28/23  the cath showed a patent stent, with minimal lesion 50-60% at the ostium of a very small diagonal branch.  He has no access site issues in his right groin, mild ecchymosis.      comes in for cardiovascular follow-up.  Labs and EKGs were reviewed.  1 episode of substernal chest pressure.  Lasting for few minutes to an hour.  He felt it was indigestion.  Nonexertional.  No other associated symptoms.  Does not do regular exercises.  Has been trying to control his diet well.  He does have gastrointestinal symptoms.  His metformin was decreased recently. . Overall he has been feeling well. There has been no recent illness or hospital stay. Asymptomatic from cardiovascular and arrhythmia standpoint.  Active with no new exertional complaints.  Today he denies chest pain, pressure, unusual shortness of breath, lightheadedness, dizziness, near syncope or syncope.  . his dietary restrictions were not as good since last hemoglobin A1c 5.5.  He has remained very active  As you know his other active medical problems are as listed below.  CAD: The patient is status post PCI of the left circumflex. 10/15.  Now the patient states he feels much better. Patient has no exertional chest discomfort or shortness of breath. CCS class 0.    On beta blockers.  Preserved LV systolic function.  now On PCSK9 inhibitors.  Dyslipidemia.   mixed. Could not tolerate Crestor. was on praluent and fish oil. tolerating it well.  Hypertensive heart disease.  No CHF.  No CKD.  Nicotine addiction. Stopped cigarette smoking.  Type 2 diabetes mellitus

## 2023-12-28 NOTE — REVIEW OF SYSTEMS
[SOB] : no shortness of breath [Dyspnea on exertion] : not dyspnea during exertion [Chest Discomfort] : no chest discomfort [Lower Ext Edema] : no extremity edema [Leg Claudication] : no intermittent leg claudication [Palpitations] : no palpitations [Orthopnea] : no orthopnea [PND] : no PND [Syncope] : no syncope [Under Stress] : under stress [Negative] : Heme/Lymph [FreeTextEntry7] : As per HPI [FreeTextEntry5] : As per HPI

## 2023-12-28 NOTE — CARDIOLOGY SUMMARY
[de-identified] : EKG 1/11/21 normal sinus rhythm with IRBBB and frequent PVCs, not seen on prior. He was coming off a 3 set of night shifts without adequate sleep at the time.  EKG.  November 15, 2021.  Normal sinus rhythm.  Nonspecific ST-T changes. November 7, 2022 normal sinus rhythm nonspecific ST-T changes November 6, 2023 normal sinus rhythm nonspecific ST-T changes [de-identified] :  Nuclear stress test 1/29/21 showed small inferoapical defect which was reversible and consistent with mild ischemia. However on further review  defect not seen on prone imaging, therefore less likely ischemia. Also noted some diaphragmatic attenuation. \par   [de-identified] : Echocardiogram 1/26/21 showed normal LV systolic function.\par   [de-identified] : Coronary angiogram. October 2015. LV ejection fraction 55%. LCx 90% treated with drug-eluting stent placed. Mid RCA 10-30%.\par   [de-identified] : Carotid US 4/4/17 intimal thickening

## 2023-12-28 NOTE — ASSESSMENT
[FreeTextEntry1] : Reviewed on November 6, 2023. Recent labs August 2023.  Creatinine 0.85 sodium 138 potassium 4.4 LFT normal CBC normal LDL 34 HDL 46 triglycerides 156 total cholesterol 106 hemoglobin A1c 6.4 LFT otherwise normal. EKG as noted above was reviewed

## 2024-03-19 NOTE — PHYSICAL EXAM
[Well Developed] : well developed [Well Nourished] : well nourished [No Acute Distress] : no acute distress [Normal Venous Pressure] : normal venous pressure [No Carotid Bruit] : no carotid bruit [No Murmur] : no murmur [Normal S1, S2] : normal S1, S2 [No Rub] : no rub [No Gallop] : no gallop [Clear Lung Fields] : clear lung fields [Good Air Entry] : good air entry [No Respiratory Distress] : no respiratory distress  [No Edema] : no edema [Normal Gait] : normal gait [No Cyanosis] : no cyanosis [Normal Radial B/L] : normal radial B/L [No Clubbing] : no clubbing [Normal Speech] : normal speech [Moves all extremities] : moves all extremities [Alert and Oriented] : alert and oriented

## 2024-03-22 ENCOUNTER — APPOINTMENT (OUTPATIENT)
Dept: CARDIOLOGY | Facility: CLINIC | Age: 59
End: 2024-03-22
Payer: COMMERCIAL

## 2024-03-22 VITALS
SYSTOLIC BLOOD PRESSURE: 128 MMHG | DIASTOLIC BLOOD PRESSURE: 64 MMHG | OXYGEN SATURATION: 96 % | BODY MASS INDEX: 30.92 KG/M2 | WEIGHT: 197 LBS | HEART RATE: 65 BPM | HEIGHT: 67 IN

## 2024-03-22 DIAGNOSIS — Z95.5 PRESENCE OF CORONARY ANGIOPLASTY IMPLANT AND GRAFT: ICD-10-CM

## 2024-03-22 PROCEDURE — G2211 COMPLEX E/M VISIT ADD ON: CPT

## 2024-03-22 PROCEDURE — 99214 OFFICE O/P EST MOD 30 MIN: CPT

## 2024-03-22 RX ORDER — ONABOTULINUMTOXINA 200 [USP'U]/1
200 INJECTION, POWDER, LYOPHILIZED, FOR SOLUTION INTRADERMAL; INTRAMUSCULAR
Refills: 0 | Status: DISCONTINUED | COMMUNITY
End: 2024-03-22

## 2024-03-22 RX ORDER — ERENUMAB-AOOE 140 MG/ML
140 INJECTION, SOLUTION SUBCUTANEOUS
Qty: 1 | Refills: 0 | Status: DISCONTINUED | COMMUNITY
Start: 2022-10-28 | End: 2024-03-22

## 2024-03-22 RX ORDER — GALCANEZUMAB 120 MG/ML
120 INJECTION, SOLUTION SUBCUTANEOUS
Refills: 0 | Status: DISCONTINUED | COMMUNITY
End: 2024-03-22

## 2024-03-22 RX ORDER — CLOPIDOGREL BISULFATE 75 MG/1
75 TABLET, FILM COATED ORAL
Qty: 90 | Refills: 3 | Status: ACTIVE | COMMUNITY
Start: 2019-10-04 | End: 1900-01-01

## 2024-03-22 RX ORDER — OMEGA-3-ACID ETHYL ESTERS CAPSULES 1 G/1
1 CAPSULE, LIQUID FILLED ORAL
Qty: 360 | Refills: 3 | Status: ACTIVE | COMMUNITY
Start: 2024-03-22 | End: 1900-01-01

## 2024-03-22 RX ORDER — METFORMIN HYDROCHLORIDE 500 MG/1
500 TABLET, COATED ORAL TWICE DAILY
Refills: 0 | Status: ACTIVE | COMMUNITY

## 2024-03-22 RX ORDER — METFORMIN HYDROCHLORIDE 500 MG/1
500 TABLET, COATED ORAL DAILY
Refills: 0 | Status: DISCONTINUED | COMMUNITY
End: 2024-03-22

## 2024-03-22 RX ORDER — PANCRELIPASE 120000; 24000; 76000 [USP'U]/1; [USP'U]/1; [USP'U]/1
24000-76000 CAPSULE, DELAYED RELEASE PELLETS ORAL
Refills: 0 | Status: ACTIVE | COMMUNITY

## 2024-03-22 RX ORDER — ICOSAPENT ETHYL 1000 MG/1
1 CAPSULE ORAL
Qty: 360 | Refills: 3 | Status: DISCONTINUED | COMMUNITY
End: 2024-03-22

## 2024-03-22 NOTE — CARDIOLOGY SUMMARY
[de-identified] : EKG 1/11/21 normal sinus rhythm with IRBBB and frequent PVCs, not seen on prior. He was coming off a 3 set of night shifts without adequate sleep at the time.  EKG.  November 15, 2021.  Normal sinus rhythm.  Nonspecific ST-T changes. November 7, 2022 normal sinus rhythm nonspecific ST-T changes November 6, 2023 normal sinus rhythm nonspecific ST-T changes [de-identified] :  Nuclear stress test 1/29/21 showed small inferoapical defect which was reversible and consistent with mild ischemia. However on further review  defect not seen on prone imaging, therefore less likely ischemia. Also noted some diaphragmatic attenuation. November 27, 2023.  Exercise stress test.  Abnormal ischemic changes.  [de-identified] : Echocardiogram 1/26/21 showed normal LV systolic function.\par   [de-identified] : Coronary angiogram. October 2015. LV ejection fraction 55%. LCx 90% treated with drug-eluting stent placed. Mid RCA 10-30%. December 2023.  Patent stent LCx.  Dominant large LCx . 50 to 60% at the ostium of D1.  [de-identified] : Carotid US 4/4/17 intimal thickening

## 2024-03-22 NOTE — ASSESSMENT
[FreeTextEntry1] : Reviewed on November 6, 2023. Recent labs August 2023.  Creatinine 0.85 sodium 138 potassium 4.4 LFT normal CBC normal LDL 34 HDL 46 triglycerides 156 total cholesterol 106 hemoglobin A1c 6.4 LFT otherwise normal. EKG as noted above was reviewed  Reviewed on March 22, 2024. Labs from December 15, 2023 CBC CMP stable creatinine 0.83 sodium 136 potassium 4.5 LDL 86 HDL 38 triglycerides 178 hemoglobin A1c 6.2.

## 2024-03-22 NOTE — HISTORY OF PRESENT ILLNESS
[FreeTextEntry1] : LISA DARNELL  is a 58-year-old gentleman comes in for cardiovascular follow-up.    He has noticed elevated blood pressure intermittently.  No associated symptoms.  Also was not able to get Vascepa.  Wants to change it back to Lovaza.  He is abdominal pain have improved significantly with management with Creon in conjunction with gastroenterologist. No other associated symptoms.  Does not do regular exercises.  Has been trying to control his diet well.  He does have gastrointestinal symptoms.  His metformin was decreased recently. . Overall he has been feeling well. There has been no recent illness or hospital stay. Asymptomatic from cardiovascular and arrhythmia standpoint.  Active with no new exertional complaints.  Today he denies chest pain, pressure, unusual shortness of breath, lightheadedness, dizziness, near syncope or syncope.   As you know his other active medical problems are as listed below.  CAD: The patient is status post PCI of the left circumflex. 10/15.  Now the patient states he feels much better. Patient has no exertional chest discomfort or shortness of breath. CCS class 0.    On beta blockers.  Preserved LV systolic function.  now On PCSK9 inhibitors.  Dyslipidemia.   mixed. Could not tolerate Crestor. was on repatha and fish oil. tolerating it well.  Hypertensive heart disease.  No CHF.  No CKD.  Nicotine addiction. Stopped cigarette smoking.  Type 2 diabetes mellitus

## 2024-03-22 NOTE — DISCUSSION/SUMMARY
[FreeTextEntry1] : LISA DARNELL  is a 58 year M  who presents today with the above history and the following active issues.   Coronary atherosclerosis. Native vessel. Prior PCI in 2015 to left circumflex.  Coronary angiography with moderate ostial D1 lesion.  Patent LCx stent.  Preserved EF.  No exertional further symptoms.  Continue aggressive medical management.  Any recurrent worsening or resting symptoms he will call 911 and go to nearest emergency room.  We will continue with clopidogrel and lipid management. Continue aggressive lifestyle and risk factor modifications.  Secondary prevention. Continue single antiplatelet agent continue with PCSK9 inhibitor.   Even the goal for LDL cholesterol is at least less than 70 unable to add Zetia or statin because of intolerance and significant gastrointestinal symptoms recently treated now with Creon.  Once stable gastrointestinal symptoms we can try addition of Zetia to the present regimen.  Hypertensive heart disease. Mild left ventricular enlargement. Normal LV systolic function.  Continue present medication which includes valsartan and metoprolol..  Considering elevated blood pressure at home intermittently.  Asymptomatic.  Very well-controlled blood pressure on repeated examination today.  I have recommended to check blood pressure next 1 week.  Follow with for morning and afternoon before the dosage of medications.  And may need to consider changing valsartan to 80 mg twice daily or addition of hydrochlorothiazide to the present regimen. Goal less than 130/80.  Type 2 diabetes mellitus. Uncomplicated.  But increasing from 5.5-6.4.  ASCVD.  Counseling regarding dietary restrictions regular activity and exercise was done. Goals have been reviewed.  On metformin lower dose.  Evaluation management with your office.  Hyperlipidemia.  Mixed lipid management. Tolerating PCSK9 , inhibitor without significant adverse effect. Follow labs.  Unable to obtain Vascepa because of economical and insurance reason.  We will try Lovaza 2 g twice daily again.  Risk benefits alternatives side effects reviewed.  He has taken that medication in the past.  As discussed above probable addition of Zetia 10 mg to the present regimen to achieve the goal of LDL closer at least less than 70 based on his response to Creon and stability of gastrointestinal symptoms. Increasing walking activity and exercise are strongly recommended to help both dyslipidemia and diabetes mellitus. Risk benefits alternatives were reviewed.  Intermittent vaping. Smoking cessation. Counseling done. Relationship with ASCVD, and associated morbidity, mortality was reviewed. Options available discussed.   Counseling regarding low saturated fat, salt and carbohydrate intake was reviewed. Active lifestyle and regular. Exercise along with weight management is advised. All the above were at length reviewed. Answered all the questions. Thank you very much for this kind referral. Please do not hesitate to give me a call for any question. Part of this transcription was done with voice recognition software and phonetically similar errors are common. I apologize for that. Please do not hesitate to call for any questions due to above.  Sincerely, Levy Stanford MD,FAC,[facility]

## 2024-04-03 ENCOUNTER — NON-APPOINTMENT (OUTPATIENT)
Age: 59
End: 2024-04-03

## 2024-05-02 NOTE — PHYSICAL EXAM
[Well Developed] : well developed [Well Nourished] : well nourished [No Acute Distress] : no acute distress [Normal Venous Pressure] : normal venous pressure [No Carotid Bruit] : no carotid bruit [Normal S1, S2] : normal S1, S2 [No Murmur] : no murmur [No Rub] : no rub [No Gallop] : no gallop [Clear Lung Fields] : clear lung fields [Good Air Entry] : good air entry [No Respiratory Distress] : no respiratory distress  [Normal Gait] : normal gait [No Edema] : no edema [No Cyanosis] : no cyanosis [No Clubbing] : no clubbing [Normal Radial B/L] : normal radial B/L [Normal Speech] : normal speech [Alert and Oriented] : alert and oriented

## 2024-05-06 ENCOUNTER — APPOINTMENT (OUTPATIENT)
Dept: CARDIOLOGY | Facility: CLINIC | Age: 59
End: 2024-05-06
Payer: COMMERCIAL

## 2024-05-06 VITALS
BODY MASS INDEX: 31.08 KG/M2 | SYSTOLIC BLOOD PRESSURE: 140 MMHG | WEIGHT: 198 LBS | OXYGEN SATURATION: 96 % | DIASTOLIC BLOOD PRESSURE: 64 MMHG | HEIGHT: 67 IN | HEART RATE: 63 BPM

## 2024-05-06 DIAGNOSIS — I25.10 ATHEROSCLEROTIC HEART DISEASE OF NATIVE CORONARY ARTERY W/OUT ANGINA PECTORIS: ICD-10-CM

## 2024-05-06 DIAGNOSIS — I49.3 VENTRICULAR PREMATURE DEPOLARIZATION: ICD-10-CM

## 2024-05-06 DIAGNOSIS — I10 ESSENTIAL (PRIMARY) HYPERTENSION: ICD-10-CM

## 2024-05-06 DIAGNOSIS — E78.1 PURE HYPERGLYCERIDEMIA: ICD-10-CM

## 2024-05-06 DIAGNOSIS — E78.2 MIXED HYPERLIPIDEMIA: ICD-10-CM

## 2024-05-06 PROCEDURE — G2211 COMPLEX E/M VISIT ADD ON: CPT

## 2024-05-06 PROCEDURE — 99214 OFFICE O/P EST MOD 30 MIN: CPT

## 2024-05-06 RX ORDER — AMLODIPINE BESYLATE 2.5 MG/1
2.5 TABLET ORAL
Qty: 90 | Refills: 1 | Status: ACTIVE | COMMUNITY
Start: 2024-05-06 | End: 1900-01-01

## 2024-05-06 RX ORDER — CHLORTHALIDONE 25 MG/1
25 TABLET ORAL DAILY
Qty: 45 | Refills: 0 | Status: DISCONTINUED | COMMUNITY
Start: 2024-04-12 | End: 2024-05-06

## 2024-05-06 RX ORDER — ONABOTULINUMTOXINA 100 [USP'U]/1
INJECTION, POWDER, LYOPHILIZED, FOR SOLUTION INTRADERMAL; INTRAMUSCULAR
Refills: 0 | Status: ACTIVE | COMMUNITY

## 2024-05-06 RX ORDER — ATOGEPANT 60 MG/1
60 TABLET ORAL DAILY
Refills: 0 | Status: ACTIVE | COMMUNITY

## 2024-05-06 RX ORDER — ALIROCUMAB 75 MG/ML
75 INJECTION, SOLUTION SUBCUTANEOUS
Qty: 6 | Refills: 3 | Status: ACTIVE | COMMUNITY
Start: 2020-03-11 | End: 1900-01-01

## 2024-05-06 NOTE — HISTORY OF PRESENT ILLNESS
[FreeTextEntry1] : LISA DARNELL  is a 58-year-old gentleman comes in for cardiovascular follow-up.   Management of his hypertension.  On chlorthalidone his blood pressure has not changed.  But he has significant dry mouth and thirst.  Sodium level was lower.  He has been following his blood pressure closely.  His diet though not well-controlled in relation to carbohydrate intake.  He is abdominal pain have improved significantly with management with Creon in conjunction with gastroenterologist. No other associated symptoms.  Does not do regular exercises.  Has been trying to control his diet well.  He does have gastrointestinal symptoms.  His metformin was decreased recently. . Overall he has been feeling well. There has been no recent illness or hospital stay. Asymptomatic from cardiovascular and arrhythmia standpoint.  Active with no new exertional complaints.  Today he denies chest pain, pressure, unusual shortness of breath, lightheadedness, dizziness, near syncope or syncope.   As you know his other active medical problems are as listed below.  CAD: The patient is status post PCI of the left circumflex. 10/15.  Now the patient states he feels much better. Patient has no exertional chest discomfort or shortness of breath. CCS class 0.    On beta blockers.  Preserved LV systolic function.  now On PCSK9 inhibitors.  Dyslipidemia.   mixed. Could not tolerate Crestor. was on repatha and fish oil. tolerating it well.  Hypertensive heart disease.  No CHF.  No CKD.  Nicotine addiction. Stopped cigarette smoking.  Type 2 diabetes mellitus

## 2024-05-06 NOTE — ASSESSMENT
[FreeTextEntry1] : Reviewed on November 6, 2023. Recent labs August 2023.  Creatinine 0.85 sodium 138 potassium 4.4 LFT normal CBC normal LDL 34 HDL 46 triglycerides 156 total cholesterol 106 hemoglobin A1c 6.4 LFT otherwise normal. EKG as noted above was reviewed  Reviewed on March 22, 2024. Labs from December 15, 2023 CBC CMP stable creatinine 0.83 sodium 136 potassium 4.5 LDL 86 HDL 38 triglycerides 178 hemoglobin A1c 6.2.  Reviewed on May 6, 2024. His blood pressure readings from home were reviewed.  Most recent labs are June sodium 132 triglycerides 157 total cholesterol 118 HDL 41 LDL 50.

## 2024-05-06 NOTE — DISCUSSION/SUMMARY
[FreeTextEntry1] : LISA DARNELL  is a 58 year M  who presents today with the above history and the following active issues.   Hypertensive heart disease. Mild left ventricular enlargement. Normal LV systolic function.  Continue present medication which includes valsartan, chlorthalidone and metoprolol..  Considering elevated blood pressure at home intermittently.  Asymptomatic.  Considering hyponatremia, no improved blood pressure with addition of low-dose of chlorthalidone we will stop chlorthalidone at present.  Add amlodipine 2.5 mg to the present regimen.  In the past 5 mg gave him significant headache.  Risk benefits alternatives side effects reviewed.  Reviewed lifestyle modifications with him as he has not been able to control diet as well as do regular exercises.  Keep blood pressure readings at home.  Overall long-term goal less than 130/80.  Labs ordered.  Coronary atherosclerosis. Native vessel. Prior PCI in 2015 to left circumflex.  Coronary angiography with moderate ostial D1 lesion.  Patent LCx stent.  Preserved EF.  No exertional further symptoms.  Continue aggressive medical management.  Any recurrent worsening or resting symptoms he will call 911 and go to nearest emergency room.  We will continue with clopidogrel and lipid management. Continue aggressive lifestyle and risk factor modifications.  Secondary prevention. Continue single antiplatelet agent continue with PCSK9 inhibitor.  LDL goal has been achieved.  Continue present regimen medications.  Type 2 diabetes mellitus. Uncomplicated.  But increasing from 5.5-6.4.  ASCVD.  Counseling regarding dietary restrictions regular activity and exercise was done. Goals have been reviewed.  On metformin lower dose.  Evaluation management with your office.  Hyperlipidemia.  Mixed lipid management. Tolerating PCSK9 , inhibitor without significant adverse effect. Follow labs.  Unable to obtain Vascepa because of economical and insurance reason.  We will try Lovaza 2 g twice daily again.  Risk benefits alternatives side effects reviewed.  He has taken that medication in the past.  As discussed above probable addition of Zetia 10 mg to the present regimen to achieve the goal of LDL closer at least less than 70 based on his response to Creon and stability of gastrointestinal symptoms. Increasing walking activity and exercise are strongly recommended to help both dyslipidemia and diabetes mellitus. Risk benefits alternatives were reviewed.  Intermittent vaping. Smoking cessation. Counseling done. Relationship with ASCVD, and associated morbidity, mortality was reviewed. Options available discussed.   Counseling regarding low saturated fat, salt and carbohydrate intake was reviewed. Active lifestyle and regular. Exercise along with weight management is advised. All the above were at length reviewed. Answered all the questions. Thank you very much for this kind referral. Please do not hesitate to give me a call for any question. Part of this transcription was done with voice recognition software and phonetically similar errors are common. I apologize for that. Please do not hesitate to call for any questions due to above.  Sincerely, Levy Stanford MD,[facility]

## 2024-05-06 NOTE — CARDIOLOGY SUMMARY
[de-identified] : EKG 1/11/21 normal sinus rhythm with IRBBB and frequent PVCs, not seen on prior. He was coming off a 3 set of night shifts without adequate sleep at the time.  EKG.  November 15, 2021.  Normal sinus rhythm.  Nonspecific ST-T changes. November 7, 2022 normal sinus rhythm nonspecific ST-T changes November 6, 2023 normal sinus rhythm nonspecific ST-T changes [de-identified] :  Nuclear stress test 1/29/21 showed small inferoapical defect which was reversible and consistent with mild ischemia. However on further review  defect not seen on prone imaging, therefore less likely ischemia. Also noted some diaphragmatic attenuation. November 27, 2023.  Exercise stress test.  Abnormal ischemic changes.  [de-identified] : Echocardiogram 1/26/21 showed normal LV systolic function.\par   [de-identified] : Coronary angiogram. October 2015. LV ejection fraction 55%. LCx 90% treated with drug-eluting stent placed. Mid RCA 10-30%. December 2023.  Patent stent LCx.  Dominant large LCx . 50 to 60% at the ostium of D1.  [de-identified] : Carotid US 4/4/17 intimal thickening

## 2024-05-09 ENCOUNTER — RX RENEWAL (OUTPATIENT)
Age: 59
End: 2024-05-09

## 2024-05-09 RX ORDER — METOPROLOL TARTRATE 25 MG/1
25 TABLET, FILM COATED ORAL TWICE DAILY
Qty: 180 | Refills: 3 | Status: ACTIVE | COMMUNITY
Start: 2022-03-03 | End: 1900-01-01

## 2024-07-08 ENCOUNTER — APPOINTMENT (OUTPATIENT)
Dept: CARDIOLOGY | Facility: CLINIC | Age: 59
End: 2024-07-08
Payer: COMMERCIAL

## 2024-07-08 VITALS — SYSTOLIC BLOOD PRESSURE: 128 MMHG | DIASTOLIC BLOOD PRESSURE: 64 MMHG

## 2024-07-08 VITALS
HEIGHT: 67 IN | WEIGHT: 194 LBS | BODY MASS INDEX: 30.45 KG/M2 | HEART RATE: 58 BPM | SYSTOLIC BLOOD PRESSURE: 148 MMHG | OXYGEN SATURATION: 97 % | DIASTOLIC BLOOD PRESSURE: 70 MMHG

## 2024-07-08 DIAGNOSIS — I10 ESSENTIAL (PRIMARY) HYPERTENSION: ICD-10-CM

## 2024-07-08 DIAGNOSIS — F17.290 NICOTINE DEPENDENCE, OTHER TOBACCO PRODUCT, UNCOMPLICATED: ICD-10-CM

## 2024-07-08 DIAGNOSIS — Z95.5 PRESENCE OF CORONARY ANGIOPLASTY IMPLANT AND GRAFT: ICD-10-CM

## 2024-07-08 DIAGNOSIS — I25.10 ATHEROSCLEROTIC HEART DISEASE OF NATIVE CORONARY ARTERY W/OUT ANGINA PECTORIS: ICD-10-CM

## 2024-07-08 DIAGNOSIS — E78.2 MIXED HYPERLIPIDEMIA: ICD-10-CM

## 2024-07-08 PROCEDURE — 99406 BEHAV CHNG SMOKING 3-10 MIN: CPT

## 2024-07-08 PROCEDURE — 99214 OFFICE O/P EST MOD 30 MIN: CPT | Mod: 25

## 2024-07-08 PROCEDURE — G2211 COMPLEX E/M VISIT ADD ON: CPT

## 2024-07-08 RX ORDER — AMLODIPINE BESYLATE 5 MG/1
5 TABLET ORAL DAILY
Refills: 0 | Status: ACTIVE | COMMUNITY

## 2024-08-30 ENCOUNTER — RX RENEWAL (OUTPATIENT)
Age: 59
End: 2024-08-30

## 2025-01-23 ENCOUNTER — RX RENEWAL (OUTPATIENT)
Age: 60
End: 2025-01-23

## 2025-01-30 ENCOUNTER — NON-APPOINTMENT (OUTPATIENT)
Age: 60
End: 2025-01-30

## 2025-01-30 ENCOUNTER — APPOINTMENT (OUTPATIENT)
Dept: CARDIOLOGY | Facility: CLINIC | Age: 60
End: 2025-01-30
Payer: COMMERCIAL

## 2025-01-30 VITALS
HEART RATE: 68 BPM | WEIGHT: 190 LBS | SYSTOLIC BLOOD PRESSURE: 130 MMHG | DIASTOLIC BLOOD PRESSURE: 64 MMHG | BODY MASS INDEX: 29.82 KG/M2 | OXYGEN SATURATION: 97 % | HEIGHT: 67 IN

## 2025-01-30 DIAGNOSIS — I10 ESSENTIAL (PRIMARY) HYPERTENSION: ICD-10-CM

## 2025-01-30 DIAGNOSIS — F17.290 NICOTINE DEPENDENCE, OTHER TOBACCO PRODUCT, UNCOMPLICATED: ICD-10-CM

## 2025-01-30 DIAGNOSIS — E78.2 MIXED HYPERLIPIDEMIA: ICD-10-CM

## 2025-01-30 DIAGNOSIS — I25.10 ATHEROSCLEROTIC HEART DISEASE OF NATIVE CORONARY ARTERY W/OUT ANGINA PECTORIS: ICD-10-CM

## 2025-01-30 PROCEDURE — 99406 BEHAV CHNG SMOKING 3-10 MIN: CPT

## 2025-01-30 PROCEDURE — 93000 ELECTROCARDIOGRAM COMPLETE: CPT

## 2025-01-30 PROCEDURE — 99214 OFFICE O/P EST MOD 30 MIN: CPT | Mod: 25

## 2025-03-12 ENCOUNTER — OFFICE (OUTPATIENT)
Dept: URBAN - METROPOLITAN AREA CLINIC 97 | Facility: CLINIC | Age: 60
Setting detail: OPHTHALMOLOGY
End: 2025-03-12
Payer: COMMERCIAL

## 2025-03-12 DIAGNOSIS — H02.012: ICD-10-CM

## 2025-03-12 DIAGNOSIS — H25.13: ICD-10-CM

## 2025-03-12 PROBLEM — B88.0 ACARIASIS, INFESTATION OF MITES AND OR TICS: Status: ACTIVE | Noted: 2025-03-12

## 2025-03-12 PROCEDURE — 67820 REVISE EYELASHES: CPT | Mod: E4 | Performed by: OPTOMETRIST

## 2025-03-12 PROCEDURE — 99203 OFFICE O/P NEW LOW 30 MIN: CPT | Mod: 25 | Performed by: OPTOMETRIST

## 2025-03-12 ASSESSMENT — REFRACTION_CURRENTRX
OS_OVR_VA: 20/
OD_SPHERE: +2.00
OD_VPRISM_DIRECTION: PROGS
OS_VPRISM_DIRECTION: PROGS
OD_OVR_VA: 20/
OS_ADD: +1.75
OD_AXIS: 097
OD_ADD: +1.75
OD_CYLINDER: -1.00
OS_SPHERE: +1.50
OS_CYLINDER: -1.00
OS_AXIS: 078

## 2025-03-12 ASSESSMENT — KERATOMETRY
OS_AXISANGLE_DEGREES: 004
OD_K1POWER_DIOPTERS: 44.25
METHOD_AUTO_MANUAL: AUTO
OS_K1POWER_DIOPTERS: 45.00
OD_AXISANGLE_DEGREES: 180
OD_K2POWER_DIOPTERS: 45.00
OS_K2POWER_DIOPTERS: 45.75

## 2025-03-12 ASSESSMENT — LID EXAM ASSESSMENTS
OD_TRICHIASIS: RLL 1+
OS_COMMENTS: T COLLARETTES

## 2025-03-12 ASSESSMENT — REFRACTION_AUTOREFRACTION
OS_AXIS: 171
OD_CYLINDER: +1.50
OS_SPHERE: +1.25
OS_CYLINDER: +1.00
OD_SPHERE: +1.75
OD_AXIS: 004

## 2025-03-12 ASSESSMENT — CONFRONTATIONAL VISUAL FIELD TEST (CVF)
OS_FINDINGS: FULL
OD_FINDINGS: FULL

## 2025-03-12 ASSESSMENT — VISUAL ACUITY
OD_BCVA: 20/20
OS_BCVA: 20/20

## 2025-04-12 ENCOUNTER — NON-APPOINTMENT (OUTPATIENT)
Age: 60
End: 2025-04-12

## 2025-04-29 ENCOUNTER — RX ONLY (RX ONLY)
Age: 60
End: 2025-04-29

## 2025-04-29 ENCOUNTER — OFFICE (OUTPATIENT)
Dept: URBAN - METROPOLITAN AREA CLINIC 97 | Facility: CLINIC | Age: 60
Setting detail: OPHTHALMOLOGY
End: 2025-04-29
Payer: COMMERCIAL

## 2025-04-29 DIAGNOSIS — H25.13: ICD-10-CM

## 2025-04-29 DIAGNOSIS — H43.811: ICD-10-CM

## 2025-04-29 DIAGNOSIS — H01.001: ICD-10-CM

## 2025-04-29 DIAGNOSIS — H35.40: ICD-10-CM

## 2025-04-29 PROBLEM — Z46.0 ENCOUNTER FOR FITTING AND ADJUSTMENT OF SPECTACLES AND CONTACT LENSES ; BOTH EYES: Status: ACTIVE | Noted: 2025-04-29

## 2025-04-29 PROCEDURE — 92014 COMPRE OPH EXAM EST PT 1/>: CPT | Performed by: OPTOMETRIST

## 2025-04-29 ASSESSMENT — REFRACTION_CURRENTRX
OD_SPHERE: +1.50
OS_OVR_VA: 20/
OD_AXIS: 097
OD_CYLINDER: +1.25
OS_AXIS: 078
OD_AXIS: 010
OD_ADD: +2.50
OD_SPHERE: +2.00
OS_VPRISM_DIRECTION: PROGS
OD_OVR_VA: 20/
OD_OVR_VA: 20/
OS_ADD: +1.75
OD_ADD: +1.75
OS_VPRISM_DIRECTION: PROGS
OD_VPRISM_DIRECTION: PROGS
OS_CYLINDER: -1.00
OS_CYLINDER: +1.00
OS_AXIS: 180
OD_CYLINDER: -1.00
OS_SPHERE: +1.50
OS_OVR_VA: 20/
OD_VPRISM_DIRECTION: PROGS
OS_ADD: +2.50
OS_SPHERE: +1.00

## 2025-04-29 ASSESSMENT — CONFRONTATIONAL VISUAL FIELD TEST (CVF)
OD_FINDINGS: FULL
OS_FINDINGS: FULL

## 2025-04-29 ASSESSMENT — KERATOMETRY
OD_AXISANGLE_DEGREES: 001
OD_K1POWER_DIOPTERS: 44.25
OS_K2POWER_DIOPTERS: 45.75
OS_K1POWER_DIOPTERS: 45.00
OD_K2POWER_DIOPTERS: 45.25
METHOD_AUTO_MANUAL: AUTO
OS_AXISANGLE_DEGREES: 007

## 2025-04-29 ASSESSMENT — REFRACTION_MANIFEST
OS_ADD: +2.25
OS_SPHERE: +1.50
OS_AXIS: 005
OD_SPHERE: +1.75
OD_ADD: +2.25
OD_CYLINDER: +1.25
OD_VA1: 20/25+
OD_AXIS: 005
OS_VA1: 20/20
OD_SPHERE: +2.00
OS_CYLINDER: +1.00
OD_VA1: 20/25+
OS_ADD: +2.25
OD_CYLINDER: +1.25
OS_SPHERE: +1.50
OS_VA1: 20/20
OD_AXIS: 005
OS_CYLINDER: +1.00
OD_ADD: +2.25
OS_AXIS: 180

## 2025-04-29 ASSESSMENT — LID EXAM ASSESSMENTS
OD_TRICHIASIS: ABSENT
OS_COMMENTS: T COLLARETTES
OD_BLEPHARITIS: RUL T

## 2025-04-29 ASSESSMENT — VISUAL ACUITY
OD_BCVA: 20/25
OS_BCVA: 20/20

## 2025-04-29 ASSESSMENT — REFRACTION_AUTOREFRACTION
OS_CYLINDER: +1.00
OD_AXIS: 005
OD_SPHERE: +2.00
OS_AXIS: 172
OD_CYLINDER: +1.25
OS_SPHERE: +1.75

## 2025-04-30 ENCOUNTER — RX RENEWAL (OUTPATIENT)
Age: 60
End: 2025-04-30

## 2025-05-28 ENCOUNTER — OFFICE (OUTPATIENT)
Dept: URBAN - METROPOLITAN AREA CLINIC 97 | Facility: CLINIC | Age: 60
Setting detail: OPHTHALMOLOGY
End: 2025-05-28
Payer: COMMERCIAL

## 2025-05-28 DIAGNOSIS — Z46.0: ICD-10-CM

## 2025-05-28 PROCEDURE — CLNEW CL LENS FIT FIRST TIME WEARER FITTING FEE ONLY: Performed by: OPTOMETRIST

## 2025-05-28 ASSESSMENT — REFRACTION_MANIFEST
OD_SPHERE: +1.75
OS_ADD: +2.25
OD_SPHERE: +2.00
OD_VA1: 20/25+
OS_SPHERE: +1.50
OD_AXIS: 005
OS_AXIS: 005
OS_ADD: +2.25
OD_ADD: +2.25
OD_ADD: +2.25
OS_SPHERE: +1.50
OS_CYLINDER: +1.00
OS_CYLINDER: +1.00
OD_AXIS: 005
OD_CYLINDER: +1.25
OS_VA1: 20/20
OD_VA1: 20/25+
OS_VA1: 20/20
OD_CYLINDER: +1.25
OS_AXIS: 180

## 2025-05-28 ASSESSMENT — KERATOMETRY
METHOD_AUTO_MANUAL: AUTO
OS_K2POWER_DIOPTERS: 45.75
OS_K1POWER_DIOPTERS: 45.25
OD_AXISANGLE_DEGREES: 002
OD_K1POWER_DIOPTERS: 44.00
OD_K2POWER_DIOPTERS: 45.25
OS_AXISANGLE_DEGREES: 016

## 2025-05-28 ASSESSMENT — REFRACTION_CURRENTRX
OD_SPHERE: +1.50
OS_CYLINDER: -1.00
OD_VPRISM_DIRECTION: PROGS
OS_OVR_VA: 20/
OD_OVR_VA: 20/
OD_AXIS: 097
OD_OVR_VA: 20/
OD_ADD: +1.75
OS_VPRISM_DIRECTION: PROGS
OD_ADD: +2.50
OS_VPRISM_DIRECTION: PROGS
OD_SPHERE: +2.00
OS_CYLINDER: +1.00
OD_CYLINDER: +1.25
OD_AXIS: 010
OS_AXIS: 180
OS_SPHERE: +1.50
OS_SPHERE: +1.00
OS_AXIS: 078
OS_ADD: +2.50
OD_CYLINDER: -1.00
OS_OVR_VA: 20/
OD_VPRISM_DIRECTION: PROGS
OS_ADD: +1.75

## 2025-05-28 ASSESSMENT — VISUAL ACUITY
OS_BCVA: 20/20
OD_BCVA: 20/20

## 2025-05-28 ASSESSMENT — REFRACTION_AUTOREFRACTION
OD_SPHERE: +1.50
OS_CYLINDER: +1.00
OS_SPHERE: +1.50
OD_CYLINDER: +1.50
OS_AXIS: 173
OD_AXIS: 006

## 2025-06-02 LAB
HBA1C MFR BLD HPLC: 5.9
HBA1C MFR BLD HPLC: 6.7

## 2025-06-09 ENCOUNTER — OFFICE (OUTPATIENT)
Dept: URBAN - METROPOLITAN AREA CLINIC 97 | Facility: CLINIC | Age: 60
Setting detail: OPHTHALMOLOGY
End: 2025-06-09
Payer: COMMERCIAL

## 2025-06-09 DIAGNOSIS — Z46.0: ICD-10-CM

## 2025-06-09 PROCEDURE — RX/CHECK RX/CHECK: Performed by: OPTOMETRIST

## 2025-06-09 ASSESSMENT — KERATOMETRY
METHOD_AUTO_MANUAL: AUTO
OS_AXISANGLE_DEGREES: 010
OD_K2POWER_DIOPTERS: 45.25
OD_AXISANGLE_DEGREES: 005
OS_K2POWER_DIOPTERS: 45.75
OS_K1POWER_DIOPTERS: 45.00
OD_K1POWER_DIOPTERS: 44.25

## 2025-06-09 ASSESSMENT — REFRACTION_MANIFEST
OS_AXIS: 005
OD_CYLINDER: +1.25
OS_SPHERE: +1.50
OS_ADD: +2.25
OS_VA1: 20/20
OD_SPHERE: +2.00
OD_SPHERE: +1.75
OS_ADD: +2.25
OS_AXIS: 180
OD_ADD: +2.25
OD_AXIS: 005
OS_VA1: 20/20
OD_ADD: +2.25
OS_CYLINDER: +1.00
OD_AXIS: 005
OS_SPHERE: +1.50
OD_VA1: 20/25+
OD_VA1: 20/25+
OD_CYLINDER: +1.25
OS_CYLINDER: +1.00

## 2025-06-09 ASSESSMENT — REFRACTION_CURRENTRX
OD_ADD: +1.75
OS_ADD: +2.50
OS_CYLINDER: +1.00
OD_OVR_VA: 20/
OD_CYLINDER: -1.00
OD_AXIS: 010
OS_SPHERE: +1.50
OD_VPRISM_DIRECTION: PROGS
OS_AXIS: 078
OS_OVR_VA: 20/
OD_OVR_VA: 20/
OD_SPHERE: +1.50
OS_AXIS: 180
OS_ADD: +1.75
OD_VPRISM_DIRECTION: PROGS
OD_ADD: +2.50
OS_VPRISM_DIRECTION: PROGS
OD_AXIS: 097
OS_VPRISM_DIRECTION: PROGS
OS_OVR_VA: 20/
OD_CYLINDER: +1.25
OD_SPHERE: +2.00
OS_CYLINDER: -1.00
OS_SPHERE: +1.00

## 2025-06-09 ASSESSMENT — REFRACTION_AUTOREFRACTION
OD_AXIS: 005
OD_CYLINDER: +1.50
OD_SPHERE: +1.50
OS_AXIS: 170
OS_CYLINDER: +1.00
OS_SPHERE: +1.50

## 2025-06-09 ASSESSMENT — VISUAL ACUITY
OS_BCVA: 20/25-
OD_BCVA: 20/25+

## 2025-06-09 ASSESSMENT — CONFRONTATIONAL VISUAL FIELD TEST (CVF)
OD_FINDINGS: FULL
OS_FINDINGS: FULL

## 2025-07-23 ENCOUNTER — RX RENEWAL (OUTPATIENT)
Age: 60
End: 2025-07-23

## 2025-08-04 ENCOUNTER — APPOINTMENT (OUTPATIENT)
Dept: CARDIOLOGY | Facility: CLINIC | Age: 60
End: 2025-08-04

## 2025-08-04 VITALS
OXYGEN SATURATION: 96 % | BODY MASS INDEX: 29.03 KG/M2 | WEIGHT: 185 LBS | HEIGHT: 67 IN | SYSTOLIC BLOOD PRESSURE: 102 MMHG | HEART RATE: 64 BPM | DIASTOLIC BLOOD PRESSURE: 58 MMHG

## 2025-08-04 DIAGNOSIS — E78.2 MIXED HYPERLIPIDEMIA: ICD-10-CM

## 2025-08-04 DIAGNOSIS — I25.10 ATHEROSCLEROTIC HEART DISEASE OF NATIVE CORONARY ARTERY W/OUT ANGINA PECTORIS: ICD-10-CM

## 2025-08-04 DIAGNOSIS — I49.3 VENTRICULAR PREMATURE DEPOLARIZATION: ICD-10-CM

## 2025-08-04 DIAGNOSIS — I10 ESSENTIAL (PRIMARY) HYPERTENSION: ICD-10-CM

## 2025-08-04 DIAGNOSIS — E11.9 TYPE 2 DIABETES MELLITUS W/OUT COMPLICATIONS: ICD-10-CM

## 2025-08-04 DIAGNOSIS — F17.290 NICOTINE DEPENDENCE, OTHER TOBACCO PRODUCT, UNCOMPLICATED: ICD-10-CM

## 2025-08-04 DIAGNOSIS — I51.7 CARDIOMEGALY: ICD-10-CM

## 2025-08-04 DIAGNOSIS — E78.1 PURE HYPERGLYCERIDEMIA: ICD-10-CM

## 2025-08-04 DIAGNOSIS — Z95.5 PRESENCE OF CORONARY ANGIOPLASTY IMPLANT AND GRAFT: ICD-10-CM

## 2025-08-04 DIAGNOSIS — Z78.9 OTHER SPECIFIED HEALTH STATUS: ICD-10-CM

## 2025-08-04 PROCEDURE — 99214 OFFICE O/P EST MOD 30 MIN: CPT

## 2025-08-04 PROCEDURE — 93000 ELECTROCARDIOGRAM COMPLETE: CPT

## 2025-08-04 PROCEDURE — G2211 COMPLEX E/M VISIT ADD ON: CPT | Mod: NC

## 2025-08-14 ENCOUNTER — APPOINTMENT (OUTPATIENT)
Dept: CARDIOLOGY | Facility: CLINIC | Age: 60
End: 2025-08-14

## 2025-08-26 ENCOUNTER — APPOINTMENT (OUTPATIENT)
Dept: CARDIOLOGY | Facility: CLINIC | Age: 60
End: 2025-08-26
Payer: COMMERCIAL

## 2025-08-26 PROCEDURE — 93306 TTE W/DOPPLER COMPLETE: CPT

## 2025-08-28 ENCOUNTER — APPOINTMENT (OUTPATIENT)
Dept: CARDIOLOGY | Facility: CLINIC | Age: 60
End: 2025-08-28
Payer: COMMERCIAL

## 2025-08-28 VITALS
HEIGHT: 67 IN | BODY MASS INDEX: 29.19 KG/M2 | WEIGHT: 186 LBS | SYSTOLIC BLOOD PRESSURE: 124 MMHG | DIASTOLIC BLOOD PRESSURE: 68 MMHG | HEART RATE: 81 BPM | OXYGEN SATURATION: 95 %

## 2025-08-28 DIAGNOSIS — F17.290 NICOTINE DEPENDENCE, OTHER TOBACCO PRODUCT, UNCOMPLICATED: ICD-10-CM

## 2025-08-28 DIAGNOSIS — I10 ESSENTIAL (PRIMARY) HYPERTENSION: ICD-10-CM

## 2025-08-28 DIAGNOSIS — Z01.810 ENCOUNTER FOR PREPROCEDURAL CARDIOVASCULAR EXAMINATION: ICD-10-CM

## 2025-08-28 DIAGNOSIS — I34.0 NONRHEUMATIC MITRAL (VALVE) INSUFFICIENCY: ICD-10-CM

## 2025-08-28 DIAGNOSIS — E78.2 MIXED HYPERLIPIDEMIA: ICD-10-CM

## 2025-08-28 DIAGNOSIS — I25.10 ATHEROSCLEROTIC HEART DISEASE OF NATIVE CORONARY ARTERY W/OUT ANGINA PECTORIS: ICD-10-CM

## 2025-08-28 PROCEDURE — 99214 OFFICE O/P EST MOD 30 MIN: CPT

## 2025-08-28 PROCEDURE — G2211 COMPLEX E/M VISIT ADD ON: CPT | Mod: NC

## 2025-09-04 ENCOUNTER — RESULT REVIEW (OUTPATIENT)
Age: 60
End: 2025-09-04